# Patient Record
Sex: MALE | Race: BLACK OR AFRICAN AMERICAN | Employment: OTHER | URBAN - METROPOLITAN AREA
[De-identification: names, ages, dates, MRNs, and addresses within clinical notes are randomized per-mention and may not be internally consistent; named-entity substitution may affect disease eponyms.]

---

## 2018-01-19 ENCOUNTER — HOSPITAL ENCOUNTER (OUTPATIENT)
Dept: MRI IMAGING | Age: 58
Discharge: HOME | End: 2018-01-19
Attending: PSYCHIATRY & NEUROLOGY
Payer: MEDICARE

## 2018-01-19 DIAGNOSIS — R41.3 MEMORY LOSS: ICD-10-CM

## 2018-01-19 DIAGNOSIS — G47.33 OSA (OBSTRUCTIVE SLEEP APNEA): ICD-10-CM

## 2018-01-19 LAB — CREAT BLD-MCNC: 1 MG/DL (ref 0.8–1.5)

## 2018-01-19 PROCEDURE — 82565 ASSAY OF CREATININE: CPT

## 2018-01-19 PROCEDURE — A9577 INJ MULTIHANCE: HCPCS | Performed by: PSYCHIATRY & NEUROLOGY

## 2018-01-19 PROCEDURE — 70553 MRI BRAIN STEM W/O & W/DYE: CPT

## 2018-01-19 PROCEDURE — 74011250636 HC RX REV CODE- 250/636: Performed by: PSYCHIATRY & NEUROLOGY

## 2018-01-19 RX ORDER — SODIUM CHLORIDE 0.9 % (FLUSH) 0.9 %
10 SYRINGE (ML) INJECTION
Status: COMPLETED | OUTPATIENT
Start: 2018-01-19 | End: 2018-01-19

## 2018-01-19 RX ADMIN — Medication 10 ML: at 15:52

## 2018-01-19 RX ADMIN — GADOBENATE DIMEGLUMINE 20 ML: 529 INJECTION, SOLUTION INTRAVENOUS at 15:52

## 2018-01-22 ENCOUNTER — HOSPITAL ENCOUNTER (OUTPATIENT)
Dept: SLEEP MEDICINE | Age: 58
Discharge: HOME | End: 2018-01-22
Payer: MEDICARE

## 2018-01-22 PROCEDURE — 95810 POLYSOM 6/> YRS 4/> PARAM: CPT

## 2018-02-19 ENCOUNTER — HOSPITAL ENCOUNTER (OUTPATIENT)
Dept: SLEEP MEDICINE | Age: 58
Discharge: HOME | End: 2018-02-19
Payer: MEDICARE

## 2018-02-19 PROCEDURE — 95811 POLYSOM 6/>YRS CPAP 4/> PARM: CPT

## 2018-03-06 PROBLEM — G47.10 HYPERSOMNOLENCE: Status: ACTIVE | Noted: 2018-03-06

## 2018-03-06 PROBLEM — G47.33 OSA (OBSTRUCTIVE SLEEP APNEA): Status: ACTIVE | Noted: 2018-03-06

## 2019-09-18 PROBLEM — G47.34 NOCTURNAL HYPOXEMIA: Status: ACTIVE | Noted: 2019-09-18

## 2020-03-03 PROBLEM — N13.8 HYPERPLASIA OF PROSTATE WITH URINARY OBSTRUCTION: Status: ACTIVE | Noted: 2020-03-03

## 2020-03-03 PROBLEM — N40.1 HYPERPLASIA OF PROSTATE WITH URINARY OBSTRUCTION: Status: ACTIVE | Noted: 2020-03-03

## 2020-03-09 ENCOUNTER — HOSPITAL ENCOUNTER (OUTPATIENT)
Dept: SURGERY | Age: 60
Discharge: HOME | End: 2020-03-09

## 2020-03-10 RX ORDER — GENTAMICIN SULFATE 40 MG/ML
160 INJECTION, SOLUTION INTRAMUSCULAR; INTRAVENOUS
Status: CANCELLED | OUTPATIENT
Start: 2020-03-10 | End: 2020-03-10

## 2020-03-12 ENCOUNTER — ANESTHESIA EVENT (OUTPATIENT)
Dept: SURGERY | Age: 60
End: 2020-03-12
Payer: MEDICARE

## 2020-03-13 ENCOUNTER — HOSPITAL ENCOUNTER (OUTPATIENT)
Age: 60
Discharge: HOME | End: 2020-03-15
Attending: UROLOGY | Admitting: UROLOGY
Payer: MEDICARE

## 2020-03-13 ENCOUNTER — ANESTHESIA (OUTPATIENT)
Dept: SURGERY | Age: 60
End: 2020-03-13
Payer: MEDICARE

## 2020-03-13 DIAGNOSIS — N13.8 BPH WITH OBSTRUCTION/LOWER URINARY TRACT SYMPTOMS: Primary | ICD-10-CM

## 2020-03-13 DIAGNOSIS — N40.1 BPH WITH OBSTRUCTION/LOWER URINARY TRACT SYMPTOMS: Primary | ICD-10-CM

## 2020-03-13 LAB
ABO + RH BLD: NORMAL
BLOOD GROUP ANTIBODIES SERPL: NORMAL
GLUCOSE BLD STRIP.AUTO-MCNC: 87 MG/DL (ref 65–100)
SPECIMEN EXP DATE BLD: NORMAL

## 2020-03-13 PROCEDURE — 77030040830 HC CATH URETH FOL MDII -A: Performed by: UROLOGY

## 2020-03-13 PROCEDURE — 77030019927 HC TBNG IRR CYSTO BAXT -A: Performed by: UROLOGY

## 2020-03-13 PROCEDURE — 99218 HC RM OBSERVATION: CPT

## 2020-03-13 PROCEDURE — 76060000034 HC ANESTHESIA 1.5 TO 2 HR: Performed by: UROLOGY

## 2020-03-13 PROCEDURE — 74011250636 HC RX REV CODE- 250/636: Performed by: NURSE ANESTHETIST, CERTIFIED REGISTERED

## 2020-03-13 PROCEDURE — 76210000017 HC OR PH I REC 1.5 TO 2 HR: Performed by: UROLOGY

## 2020-03-13 PROCEDURE — 76010000149 HC OR TIME 1 TO 1.5 HR: Performed by: UROLOGY

## 2020-03-13 PROCEDURE — 74011000250 HC RX REV CODE- 250: Performed by: NURSE ANESTHETIST, CERTIFIED REGISTERED

## 2020-03-13 PROCEDURE — 77030010509 HC AIRWY LMA MSK TELE -A: Performed by: ANESTHESIOLOGY

## 2020-03-13 PROCEDURE — 74011250637 HC RX REV CODE- 250/637: Performed by: ANESTHESIOLOGY

## 2020-03-13 PROCEDURE — 77030041444 HC ELECTRD PSS QUICK-FIRE PSSU -D: Performed by: UROLOGY

## 2020-03-13 PROCEDURE — 74011250636 HC RX REV CODE- 250/636: Performed by: UROLOGY

## 2020-03-13 PROCEDURE — 77030018846 HC SOL IRR STRL H20 ICUM -A: Performed by: UROLOGY

## 2020-03-13 PROCEDURE — 74011250637 HC RX REV CODE- 250/637: Performed by: UROLOGY

## 2020-03-13 PROCEDURE — 77030018836 HC SOL IRR NACL ICUM -A: Performed by: UROLOGY

## 2020-03-13 PROCEDURE — 86900 BLOOD TYPING SEROLOGIC ABO: CPT

## 2020-03-13 PROCEDURE — 82962 GLUCOSE BLOOD TEST: CPT

## 2020-03-13 PROCEDURE — 74011250636 HC RX REV CODE- 250/636: Performed by: ANESTHESIOLOGY

## 2020-03-13 PROCEDURE — 77030040361 HC SLV COMPR DVT MDII -B: Performed by: UROLOGY

## 2020-03-13 RX ORDER — OXYCODONE HYDROCHLORIDE 5 MG/1
5 TABLET ORAL
Status: COMPLETED | OUTPATIENT
Start: 2020-03-13 | End: 2020-03-13

## 2020-03-13 RX ORDER — SODIUM CHLORIDE 0.9 % (FLUSH) 0.9 %
5-40 SYRINGE (ML) INJECTION AS NEEDED
Status: DISCONTINUED | OUTPATIENT
Start: 2020-03-13 | End: 2020-03-13 | Stop reason: HOSPADM

## 2020-03-13 RX ORDER — DEXTROSE, SODIUM CHLORIDE, SODIUM LACTATE, POTASSIUM CHLORIDE, AND CALCIUM CHLORIDE 5; .6; .31; .03; .02 G/100ML; G/100ML; G/100ML; G/100ML; G/100ML
100 INJECTION, SOLUTION INTRAVENOUS CONTINUOUS
Status: DISCONTINUED | OUTPATIENT
Start: 2020-03-13 | End: 2020-03-14

## 2020-03-13 RX ORDER — NALOXONE HYDROCHLORIDE 0.4 MG/ML
0.4 INJECTION, SOLUTION INTRAMUSCULAR; INTRAVENOUS; SUBCUTANEOUS AS NEEDED
Status: DISCONTINUED | OUTPATIENT
Start: 2020-03-13 | End: 2020-03-15 | Stop reason: HOSPADM

## 2020-03-13 RX ORDER — MORPHINE SULFATE 2 MG/ML
2 INJECTION, SOLUTION INTRAMUSCULAR; INTRAVENOUS
Status: DISCONTINUED | OUTPATIENT
Start: 2020-03-13 | End: 2020-03-15 | Stop reason: HOSPADM

## 2020-03-13 RX ORDER — FENTANYL CITRATE 50 UG/ML
INJECTION, SOLUTION INTRAMUSCULAR; INTRAVENOUS AS NEEDED
Status: DISCONTINUED | OUTPATIENT
Start: 2020-03-13 | End: 2020-03-13 | Stop reason: HOSPADM

## 2020-03-13 RX ORDER — PANTOPRAZOLE SODIUM 40 MG/1
40 TABLET, DELAYED RELEASE ORAL DAILY
Status: DISCONTINUED | OUTPATIENT
Start: 2020-03-14 | End: 2020-03-15 | Stop reason: HOSPADM

## 2020-03-13 RX ORDER — ASPIRIN 81 MG/1
81 TABLET ORAL DAILY
Status: DISCONTINUED | OUTPATIENT
Start: 2020-03-14 | End: 2020-03-15 | Stop reason: HOSPADM

## 2020-03-13 RX ORDER — TAMSULOSIN HYDROCHLORIDE 0.4 MG/1
0.4 CAPSULE ORAL
Status: DISCONTINUED | OUTPATIENT
Start: 2020-03-13 | End: 2020-03-15 | Stop reason: HOSPADM

## 2020-03-13 RX ORDER — SODIUM CHLORIDE 0.9 % (FLUSH) 0.9 %
5-40 SYRINGE (ML) INJECTION EVERY 8 HOURS
Status: DISCONTINUED | OUTPATIENT
Start: 2020-03-13 | End: 2020-03-13 | Stop reason: HOSPADM

## 2020-03-13 RX ORDER — LIDOCAINE HYDROCHLORIDE 20 MG/ML
INJECTION, SOLUTION EPIDURAL; INFILTRATION; INTRACAUDAL; PERINEURAL AS NEEDED
Status: DISCONTINUED | OUTPATIENT
Start: 2020-03-13 | End: 2020-03-13 | Stop reason: HOSPADM

## 2020-03-13 RX ORDER — HYDROMORPHONE HYDROCHLORIDE 2 MG/ML
0.5 INJECTION, SOLUTION INTRAMUSCULAR; INTRAVENOUS; SUBCUTANEOUS
Status: COMPLETED | OUTPATIENT
Start: 2020-03-13 | End: 2020-03-13

## 2020-03-13 RX ORDER — VERAPAMIL HYDROCHLORIDE 240 MG/1
240 TABLET, FILM COATED, EXTENDED RELEASE ORAL
Status: DISCONTINUED | OUTPATIENT
Start: 2020-03-13 | End: 2020-03-15 | Stop reason: HOSPADM

## 2020-03-13 RX ORDER — HYDROMORPHONE HYDROCHLORIDE 2 MG/ML
INJECTION, SOLUTION INTRAMUSCULAR; INTRAVENOUS; SUBCUTANEOUS AS NEEDED
Status: DISCONTINUED | OUTPATIENT
Start: 2020-03-13 | End: 2020-03-13 | Stop reason: HOSPADM

## 2020-03-13 RX ORDER — DIPHENHYDRAMINE HCL 25 MG
25 CAPSULE ORAL
Status: DISCONTINUED | OUTPATIENT
Start: 2020-03-13 | End: 2020-03-15 | Stop reason: HOSPADM

## 2020-03-13 RX ORDER — OXYCODONE AND ACETAMINOPHEN 5; 325 MG/1; MG/1
1 TABLET ORAL
Status: DISCONTINUED | OUTPATIENT
Start: 2020-03-13 | End: 2020-03-15 | Stop reason: HOSPADM

## 2020-03-13 RX ORDER — ATORVASTATIN CALCIUM 40 MG/1
80 TABLET, FILM COATED ORAL
Status: DISCONTINUED | OUTPATIENT
Start: 2020-03-13 | End: 2020-03-15 | Stop reason: HOSPADM

## 2020-03-13 RX ORDER — MEMANTINE HYDROCHLORIDE 5 MG/1
10 TABLET ORAL DAILY
Status: DISCONTINUED | OUTPATIENT
Start: 2020-03-14 | End: 2020-03-15 | Stop reason: HOSPADM

## 2020-03-13 RX ORDER — LEVOTHYROXINE SODIUM 125 UG/1
125 TABLET ORAL
Status: DISCONTINUED | OUTPATIENT
Start: 2020-03-14 | End: 2020-03-15 | Stop reason: HOSPADM

## 2020-03-13 RX ORDER — ONDANSETRON 2 MG/ML
INJECTION INTRAMUSCULAR; INTRAVENOUS AS NEEDED
Status: DISCONTINUED | OUTPATIENT
Start: 2020-03-13 | End: 2020-03-13 | Stop reason: HOSPADM

## 2020-03-13 RX ORDER — SODIUM CHLORIDE 0.9 % (FLUSH) 0.9 %
5-40 SYRINGE (ML) INJECTION AS NEEDED
Status: DISCONTINUED | OUTPATIENT
Start: 2020-03-13 | End: 2020-03-15 | Stop reason: HOSPADM

## 2020-03-13 RX ORDER — DOCUSATE SODIUM 100 MG/1
100 CAPSULE, LIQUID FILLED ORAL 2 TIMES DAILY
Status: DISCONTINUED | OUTPATIENT
Start: 2020-03-13 | End: 2020-03-15 | Stop reason: HOSPADM

## 2020-03-13 RX ORDER — MIDAZOLAM HYDROCHLORIDE 1 MG/ML
2 INJECTION, SOLUTION INTRAMUSCULAR; INTRAVENOUS
Status: DISCONTINUED | OUTPATIENT
Start: 2020-03-13 | End: 2020-03-13 | Stop reason: HOSPADM

## 2020-03-13 RX ORDER — CIPROFLOXACIN 500 MG/1
500 TABLET ORAL EVERY 12 HOURS
Status: DISCONTINUED | OUTPATIENT
Start: 2020-03-13 | End: 2020-03-13

## 2020-03-13 RX ORDER — MIDAZOLAM HYDROCHLORIDE 1 MG/ML
INJECTION, SOLUTION INTRAMUSCULAR; INTRAVENOUS AS NEEDED
Status: DISCONTINUED | OUTPATIENT
Start: 2020-03-13 | End: 2020-03-13 | Stop reason: HOSPADM

## 2020-03-13 RX ORDER — OXYCODONE AND ACETAMINOPHEN 10; 325 MG/1; MG/1
1 TABLET ORAL AS NEEDED
Status: DISCONTINUED | OUTPATIENT
Start: 2020-03-13 | End: 2020-03-13 | Stop reason: HOSPADM

## 2020-03-13 RX ORDER — GENTAMICIN SULFATE 80 MG/100ML
80 INJECTION, SOLUTION INTRAVENOUS
Status: COMPLETED | OUTPATIENT
Start: 2020-03-13 | End: 2020-03-13

## 2020-03-13 RX ORDER — OXYBUTYNIN CHLORIDE 5 MG/1
5 TABLET ORAL
Status: DISCONTINUED | OUTPATIENT
Start: 2020-03-13 | End: 2020-03-15 | Stop reason: HOSPADM

## 2020-03-13 RX ORDER — ACETAMINOPHEN 325 MG/1
650 TABLET ORAL
Status: DISCONTINUED | OUTPATIENT
Start: 2020-03-13 | End: 2020-03-15 | Stop reason: HOSPADM

## 2020-03-13 RX ORDER — SODIUM CHLORIDE, SODIUM LACTATE, POTASSIUM CHLORIDE, CALCIUM CHLORIDE 600; 310; 30; 20 MG/100ML; MG/100ML; MG/100ML; MG/100ML
75 INJECTION, SOLUTION INTRAVENOUS CONTINUOUS
Status: DISCONTINUED | OUTPATIENT
Start: 2020-03-13 | End: 2020-03-13 | Stop reason: HOSPADM

## 2020-03-13 RX ORDER — SODIUM CHLORIDE 0.9 % (FLUSH) 0.9 %
5-40 SYRINGE (ML) INJECTION EVERY 8 HOURS
Status: DISCONTINUED | OUTPATIENT
Start: 2020-03-13 | End: 2020-03-15 | Stop reason: HOSPADM

## 2020-03-13 RX ORDER — CEPHALEXIN 500 MG/1
500 CAPSULE ORAL 4 TIMES DAILY
Status: DISCONTINUED | OUTPATIENT
Start: 2020-03-13 | End: 2020-03-15 | Stop reason: HOSPADM

## 2020-03-13 RX ORDER — PROPOFOL 10 MG/ML
INJECTION, EMULSION INTRAVENOUS AS NEEDED
Status: DISCONTINUED | OUTPATIENT
Start: 2020-03-13 | End: 2020-03-13 | Stop reason: HOSPADM

## 2020-03-13 RX ORDER — ATROPA BELLADONNA AND OPIUM 16.2; 6 MG/1; MG/1
1 SUPPOSITORY RECTAL ONCE
Status: COMPLETED | OUTPATIENT
Start: 2020-03-13 | End: 2020-03-13

## 2020-03-13 RX ORDER — CEFAZOLIN SODIUM/WATER 2 G/20 ML
2 SYRINGE (ML) INTRAVENOUS
Status: COMPLETED | OUTPATIENT
Start: 2020-03-13 | End: 2020-03-13

## 2020-03-13 RX ADMIN — SODIUM CHLORIDE, SODIUM LACTATE, POTASSIUM CHLORIDE, CALCIUM CHLORIDE, AND DEXTROSE MONOHYDRATE 100 ML/HR: 600; 310; 30; 20; 5 INJECTION, SOLUTION INTRAVENOUS at 22:57

## 2020-03-13 RX ADMIN — PROPOFOL 100 MG: 10 INJECTION, EMULSION INTRAVENOUS at 08:01

## 2020-03-13 RX ADMIN — Medication 10 ML: at 21:55

## 2020-03-13 RX ADMIN — OXYCODONE HYDROCHLORIDE AND ACETAMINOPHEN 1 TABLET: 5; 325 TABLET ORAL at 16:17

## 2020-03-13 RX ADMIN — HYDROMORPHONE HYDROCHLORIDE 0.5 MG: 2 INJECTION INTRAMUSCULAR; INTRAVENOUS; SUBCUTANEOUS at 10:08

## 2020-03-13 RX ADMIN — Medication 2 G: at 07:54

## 2020-03-13 RX ADMIN — OXYBUTYNIN CHLORIDE 5 MG: 5 TABLET ORAL at 11:27

## 2020-03-13 RX ADMIN — HYDROMORPHONE HYDROCHLORIDE 0.5 MG: 2 INJECTION INTRAMUSCULAR; INTRAVENOUS; SUBCUTANEOUS at 10:20

## 2020-03-13 RX ADMIN — FENTANYL CITRATE 50 MCG: 50 INJECTION INTRAMUSCULAR; INTRAVENOUS at 08:09

## 2020-03-13 RX ADMIN — HYDROMORPHONE HYDROCHLORIDE 0.5 MG: 2 INJECTION INTRAMUSCULAR; INTRAVENOUS; SUBCUTANEOUS at 10:39

## 2020-03-13 RX ADMIN — OXYCODONE HYDROCHLORIDE 5 MG: 5 TABLET ORAL at 11:27

## 2020-03-13 RX ADMIN — CEPHALEXIN 500 MG: 500 CAPSULE ORAL at 21:54

## 2020-03-13 RX ADMIN — PROPOFOL 200 MG: 10 INJECTION, EMULSION INTRAVENOUS at 08:00

## 2020-03-13 RX ADMIN — FENTANYL CITRATE 50 MCG: 50 INJECTION INTRAMUSCULAR; INTRAVENOUS at 08:21

## 2020-03-13 RX ADMIN — FENTANYL CITRATE 50 MCG: 50 INJECTION INTRAMUSCULAR; INTRAVENOUS at 08:28

## 2020-03-13 RX ADMIN — MIDAZOLAM HYDROCHLORIDE 2 MG: 2 INJECTION, SOLUTION INTRAMUSCULAR; INTRAVENOUS at 07:56

## 2020-03-13 RX ADMIN — HYDROMORPHONE HYDROCHLORIDE 0.5 MG: 2 INJECTION INTRAMUSCULAR; INTRAVENOUS; SUBCUTANEOUS at 09:00

## 2020-03-13 RX ADMIN — TAMSULOSIN HYDROCHLORIDE 0.4 MG: 0.4 CAPSULE ORAL at 21:54

## 2020-03-13 RX ADMIN — GENTAMICIN SULFATE 80 MG: 80 INJECTION, SOLUTION INTRAVENOUS at 07:47

## 2020-03-13 RX ADMIN — ATORVASTATIN CALCIUM 80 MG: 40 TABLET, FILM COATED ORAL at 21:54

## 2020-03-13 RX ADMIN — Medication 10 ML: at 14:37

## 2020-03-13 RX ADMIN — SODIUM CHLORIDE, SODIUM LACTATE, POTASSIUM CHLORIDE, CALCIUM CHLORIDE, AND DEXTROSE MONOHYDRATE 100 ML/HR: 600; 310; 30; 20; 5 INJECTION, SOLUTION INTRAVENOUS at 12:59

## 2020-03-13 RX ADMIN — LIDOCAINE HYDROCHLORIDE 100 MG: 20 INJECTION, SOLUTION EPIDURAL; INFILTRATION; INTRACAUDAL; PERINEURAL at 08:00

## 2020-03-13 RX ADMIN — VERAPAMIL HYDROCHLORIDE 240 MG: 240 TABLET, FILM COATED, EXTENDED RELEASE ORAL at 21:53

## 2020-03-13 RX ADMIN — CEPHALEXIN 500 MG: 500 CAPSULE ORAL at 17:36

## 2020-03-13 RX ADMIN — CEPHALEXIN 500 MG: 500 CAPSULE ORAL at 12:58

## 2020-03-13 RX ADMIN — ONDANSETRON 4 MG: 2 INJECTION INTRAMUSCULAR; INTRAVENOUS at 08:21

## 2020-03-13 RX ADMIN — SODIUM CHLORIDE, SODIUM LACTATE, POTASSIUM CHLORIDE, AND CALCIUM CHLORIDE 75 ML/HR: 600; 310; 30; 20 INJECTION, SOLUTION INTRAVENOUS at 07:04

## 2020-03-13 RX ADMIN — GENTAMICIN SULFATE 80 MG: 80 INJECTION, SOLUTION INTRAVENOUS at 07:04

## 2020-03-13 RX ADMIN — HYDROMORPHONE HYDROCHLORIDE 0.5 MG: 2 INJECTION INTRAMUSCULAR; INTRAVENOUS; SUBCUTANEOUS at 10:31

## 2020-03-13 RX ADMIN — FENTANYL CITRATE 50 MCG: 50 INJECTION INTRAMUSCULAR; INTRAVENOUS at 07:56

## 2020-03-13 RX ADMIN — HYDROMORPHONE HYDROCHLORIDE 0.5 MG: 2 INJECTION INTRAMUSCULAR; INTRAVENOUS; SUBCUTANEOUS at 09:05

## 2020-03-13 RX ADMIN — ACETAMINOPHEN 650 MG: 325 TABLET, FILM COATED ORAL at 23:05

## 2020-03-13 RX ADMIN — ATROPA BELLADONNA AND OPIUM 1 SUPPOSITORY: 16.2; 6 SUPPOSITORY RECTAL at 11:32

## 2020-03-13 NOTE — PROGRESS NOTES
Pt states that he is immune to cipro.  Dr. Princess Jackson aware and new order for keflex 500 mg QID and d/c cipro

## 2020-03-13 NOTE — PERIOP NOTES
Family visiting at bedside at this time, pt still complaining of intense pressure and burning sensation in his bladder/penis.  Dr. Paul Monet gave verbal order for B&O suppository x once a this time

## 2020-03-13 NOTE — OP NOTES
[facility]  OPERATIVE REPORT    Name:  Nixon Wong  MR#:  [id number]  :  1960  ACCOUNT #:  [de-identified]  DATE OF SERVICE:  2020    PREOPERATIVE DIAGNOSIS:  Benign prostatic hyperplasia with lower urinary tract symptoms. POSTOPERATIVE DIAGNOSIS:  Benign prostatic hyperplasia with lower urinary tract symptoms. PROCEDURE PERFORMED:  Transurethral bipolar vaporization of the prostate. SURGEON:  Bernard Angel MD.    ASSISTANT:  None. ANESTHESIA:  General.    COMPLICATIONS:  None. SPECIMENS REMOVED:  None. IMPLANTS:  None. ESTIMATED BLOOD LOSS:  Minimal.    FINDINGS:  Per dictation. OPERATIVE PROCEDURE:  The patient was taken to the operating room suite, underwent general anesthesia, placed in the dorsal lithotomy position, prepped with Betadine and draped in appropriate manner. A #28-Salvadorean Linette Nails sound was used to calibrate the urethra. Then a 26-Salvadorean resectoscope was passed per urethra into the bladder. Using the bipolar button electrode, the patient had the bladder inspected. There were no lesions. He had a prominent median intravesical lobe which was causing most of his issues and some lateral lobe hypertrophy. Beginning with the median lobe, this tissue was vaporized until the bladder neck was wide open and then the lateral tissue was vaporized. The electrocautery was used for hemostasis throughout the procedure. There was good hemostasis and a good open lumen at the end of the case. The patient had the bladder filled with irrigation and the scope was removed and there was a good strong stream.  The patient then had a 24-Salvadorean three-way Lopez catheter inserted into the bladder and connected to continuous bladder irrigation. The patient tolerated the procedure well. The urine was pink and clear. He would keep the catheter for at least 36-24 hours and would have the catheter removed prior to discharge.       [address] [place] [address] MD [email or web address]  D:  03/13/2020 9:39  T:  03/13/2020 13:38  JOB #:  [id number]

## 2020-03-13 NOTE — H&P
64 y.o., male presents for TURP  Patient with BPH voiding symptoms. CT showed no stone or hydronephrosis. There was a small right renal nonobstructing stone. Does have some median lobe hypertrophy and some slight enlargement of the prostate. 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/2014    Hypertrophy of prostate without urinary obstruction and other lower urinary tract symptoms (LUTS) 3/4/2014    Impotence of organic origin 3/4/2014    Osteoarthrosis, unspecified whether generalized or localized, unspecified site 3/4/2014    Other abnormal glucose 3/4/2014    Other and unspecified hyperlipidemia 3/4/2014    Other specified disorder of male genital organs(608.89) 3/4/2014    Prostatitis, unspecified 3/4/2014    Thyroid disease     graves dse x 9yrs    Unspecified hereditary and idiopathic peripheral neuropathy 3/4/2014    Unspecified hypothyroidism 3/4/2014    Urinary frequency 3/4/2014           Past Surgical History:   Procedure Laterality Date    HX CIRCUMCISION      HX ORTHOPAEDIC      bilat bunionectomy, then right foot screw removed    HX ORTHOPAEDIC      right shoulder surg x 3    HX ORTHOPAEDIC      left shoulder surg- 11/09    HX THYROIDECTOMY              Current Outpatient Medications   Medication Sig Dispense Refill    tamsulosin (FLOMAX) 0.4 mg capsule Take 1 Cap by mouth nightly for 90 days.  90 Cap 4    ciprofloxacin HCl (CIPRO) 500 mg tablet Take 1 tablet by mouth 2 time per day for 1 week then reduce to 1 time per day for 3 weeks 35 Tab 0    memantine (NAMENDA) 10 mg tablet 1 TABLET TWICE A  Tab 4    tadalafil (CIALIS) 5 mg tablet TAKE 1 TABLET AS NEEDED 90 Tab 3    HYDROcodone-acetaminophen (NORCO)  mg tablet   0    levothyroxine (SYNTHROID) 137 mcg tablet Take by mouth Daily (before breakfast).  CHROM MELYSSA/BRINDAL BERRY (GARCINIA CAMBOGIA PO) Take by mouth.  atorvastatin (LIPITOR) 80 mg tablet       Dexlansoprazole (DEXILANT) 60 mg CpDB Take by mouth.  verapamil SR (CALAN-SR) 240 mg CR tablet Take by mouth nightly.              Allergies   Allergen Reactions    Wheat Bran Other (comments)     blisters    Wheat Containing Prod Nausea and Vomiting     Social History           Socioeconomic History    Marital status:      Spouse name: Not on file    Number of children: Not on file    Years of education: Not on file    Highest education level: Not on file   Occupational History    Not on file   Social Needs    Financial resource strain: Not on file    Food insecurity:     Worry: Not on file     Inability: Not on file    Transportation needs:     Medical: Not on file     Non-medical: Not on file   Tobacco Use    Smoking status: Never Smoker    Smokeless tobacco: Never Used   Substance and Sexual Activity    Alcohol use: No    Drug use: No    Sexual activity: Not on file   Lifestyle    Physical activity:     Days per week: Not on file     Minutes per session: Not on file    Stress: Not on file   Relationships    Social connections:     Talks on phone: Not on file     Gets together: Not on file     Attends Scientologist service: Not on file     Active member of club or organization: Not on file     Attends meetings of clubs or organizations: Not on file     Relationship status: Not on file    Intimate partner violence:     Fear of current or ex partner: Not on file     Emotionally abused: Not on file     Physically abused: Not on file     Forced sexual activity: Not on file   Other Topics Concern    Not on file   Social History Narrative    Not on file           Family History   Problem Relation Age of Onset    Hypertension Maternal Grandmother     Hypertension Maternal Grandfather     High Cholesterol Mother      Patient Vitals for the past 12 hrs:   Temp Pulse Resp BP SpO2   03/13/20 0651 98.9 °F (37.2 °C) 62 16 147/80 99 %     Physical Exam  Constitutional:       Appearance: Normal appearance. Cardiovascular:      Rate and Rhythm: Normal rate and regular rhythm. Pulmonary:      Effort: Pulmonary effort is normal.      Breath sounds: Normal breath sounds. Abdominal:      General: Abdomen is flat. Genitourinary:     Penis: Normal.    Musculoskeletal: Normal range of motion. Skin:     General: Skin is warm and dry. Neurological:      General: No focal deficit present. Mental Status: He is alert. Psychiatric:         Mood and Affect: Mood normal.           Office Cystoscopy Procedure findings: All risks, benefits and alternatives were again reviewed with patient and he is willing to proceed at this time. His genital area was prepped and draped and a sterile field applied. 2% lidocaine jelly was injected in the the urethra and allowed to dwell for several minutes. A flexible cystoscope was then inserted into the urethral meatus and advanced under direct vision. The anterior and posterior urethra appeared normal in appearance. The prostate had some lateral lobe hypertrophy with some prominence of the intravesical lobe of the prostate. After looking the bladder the scope was retroflexed and showed the prominent ball-valve median lobe pushing up into the bladder that would be causing some of the symptoms. The bladder was systematically surveyed. No bladder trabeculations were seen. No mucosal abnormalities were seen. The ureteral orifices were seen in their normal orthotopic position. The cystoscope was then removed under direct vision. The patient tolerated the procedure well. Assessment and Plan     ICD-10-CM ICD-9-CM    1.  Urinary frequency R35.0 788.41 AMB POC URINALYSIS DIP STICK AUTO W/ MICRO (PGU) CYSTOURETHROSCOPY   2. Benign non-nodular prostatic hyperplasia without lower urinary tract symptoms N40.0 600.90    3. Hyperplasia of prostate with urinary obstruction N40.1 600.91     N13.8 599.69      Patient with nocturia still with urinary frequency doing better than he did couple years ago when I saw him he had a cystoscope and showed the similar findings. Patient with BPH with prominent median lobe. No need for surgery at this time I recommend that he go on an alpha-blocker tamsulosin 1 at bedtime. CT scan on 12/19 showed no other acute process small nonobstructing renal stone in the prominent intravesical portion of the prostate. This was performed at [facility] today doing a transurethral resection of the median lobe of the prostate with the bipolar scope.  Discussed procedure and risks and complications

## 2020-03-13 NOTE — ANESTHESIA PREPROCEDURE EVALUATION
Relevant Problems   No relevant active problems       Anesthetic History               Review of Systems / Medical History  Patient summary reviewed and pertinent labs reviewed    Pulmonary        Sleep apnea: CPAP           Neuro/Psych              Cardiovascular    Hypertension              Exercise tolerance: >4 METS  Comments: Stress thallium 10/19--preserved LV fx   GI/Hepatic/Renal     GERD           Endo/Other      Hypothyroidism: well controlled  Obesity     Other Findings            Physical Exam    Airway  Mallampati: III  TM Distance: 4 - 6 cm  Neck ROM: normal range of motion   Mouth opening: Normal     Cardiovascular    Rhythm: regular           Dental    Dentition: Caps/crowns     Pulmonary                 Abdominal  GI exam deferred       Other Findings            Anesthetic Plan    ASA: 3  Anesthesia type: general          Induction: Intravenous  Anesthetic plan and risks discussed with: Patient

## 2020-03-13 NOTE — PROGRESS NOTES
Care Management Interventions  Mode of Transport at Discharge: Self  Transition of Care Consult (CM Consult): Discharge Planning  Discharge Durable Medical Equipment: No  Physical Therapy Consult: No  Occupational Therapy Consult: No  Speech Therapy Consult: No  Confirm Follow Up Transport: Self  Discharge Location  Discharge Placement: Home    Chart screened by  for discharge planning. Patient will likely return home at discharge when medically stable. No CM needs have been identified at this time. CM will continue to follow patient during hospitalization for discharge planning and needs. Please consult or notify  if any new issues arise.

## 2020-03-13 NOTE — PROGRESS NOTES
Hourly rounding completed on this shift. CBI at moderate to slow rate. Pt arrived with urine clear and urine became red at times on this shift but since has cleared. No hand irrigation on this shift. Pt ambulated in hallway once on this shift. All needs met. Pt is currently resting in bed. Will continue to monitor and give report to oncoming nurse.

## 2020-03-13 NOTE — ANESTHESIA POSTPROCEDURE EVALUATION
Procedure(s):  TRANSURETHRAL RESECTION OF PROSTATE WITH BIPOLAR. general    Anesthesia Post Evaluation      Multimodal analgesia: multimodal analgesia used between 6 hours prior to anesthesia start to PACU discharge  Patient location during evaluation: PACU  Patient participation: complete - patient participated  Level of consciousness: awake  Pain management: adequate  Airway patency: patent  Anesthetic complications: no  Cardiovascular status: acceptable  Respiratory status: acceptable  Hydration status: acceptable  Post anesthesia nausea and vomiting:  none      Vitals Value Taken Time   /83 3/13/2020 10:42 AM   Temp 36.8 °C (98.2 °F) 3/13/2020 10:45 AM   Pulse 64 3/13/2020 11:02 AM   Resp 16 3/13/2020 10:45 AM   SpO2 93 % 3/13/2020 11:02 AM   Vitals shown include unvalidated device data.

## 2020-03-13 NOTE — PROGRESS NOTES
TRANSFER - IN REPORT:    Verbal report received from Ayan Nichols RN on Xenia Rodgers  being received from PACU for routine post - op      Report consisted of patients Situation, Background, Assessment and   Recommendations(SBAR). Information from the following report(s) SBAR was reviewed with the receiving nurse. Opportunity for questions and clarification was provided. Assessment completed upon patients arrival to unit and care assumed.

## 2020-03-13 NOTE — BRIEF OP NOTE
BRIEF OPERATIVE NOTE    Date of Procedure: 3/13/2020   Preoperative Diagnosis: Benign prostatic hyperplasia with lower urinary tract symptoms, symptom details unspecified [N40.1]  Postoperative Diagnosis: Benign prostatic hyperplasia with lower urinary tract symptoms, symptom details unspecified [N40.1]    Procedure(s):  TRANSURETHRAL RESECTION OF PROSTATE WITH BIPOLAR  Surgeon(s) and Role:     Carolyn Granados MD - Primary         Surgical Assistant:     Surgical Staff:  Circ-1: Yelitza Carl RN  Scrub [id number]  Event Time In   Incision Start 0813   Incision Close 0915     Anesthesia: General   Estimated Blood Loss:min  Specimens: * No specimens in log *   Findings:    Complications:   Implants: * No implants in log *

## 2020-03-13 NOTE — PROGRESS NOTES
03/13/20 1220   Dual Skin Pressure Injury Assessment   Dual Skin Pressure Injury Assessment WDL   Second Care Provider (Based on [address] [place] [address]) Cheri Ibarra RN     Dual skin assessment completed. Pt has scars on bilateral shoulders, bilateral feet, and right knee.

## 2020-03-13 NOTE — PERIOP NOTES
TRANSFER - OUT REPORT:    Verbal report given to Simeon Randall RN(name) on Deward Rater  being transferred to [facility](unit) for routine post - op       Report consisted of patients Situation, Background, Assessment and   Recommendations(SBAR). Information from the following report(s) Kardex, OR Summary, Intake/Output and MAR was reviewed with the receiving nurse. Lines:   Peripheral IV 03/13/20 Right Hand (Active)   Site Assessment Clean, dry, & intact 3/13/2020 10:45 AM   Phlebitis Assessment 0 3/13/2020 10:45 AM   Infiltration Assessment 0 3/13/2020 10:45 AM   Dressing Status Clean, dry, & intact 3/13/2020 10:45 AM   Dressing Type Transparent;Tape 3/13/2020 10:45 AM   Hub Color/Line Status Infusing;Green 3/13/2020 10:45 AM   Alcohol Cap Used No 3/13/2020 10:45 AM        Opportunity for questions and clarification was provided.       Patient transported with:  Transport personnel

## 2020-03-14 PROCEDURE — 99218 HC RM OBSERVATION: CPT

## 2020-03-14 PROCEDURE — 74011250636 HC RX REV CODE- 250/636: Performed by: UROLOGY

## 2020-03-14 PROCEDURE — 74011250637 HC RX REV CODE- 250/637: Performed by: UROLOGY

## 2020-03-14 PROCEDURE — 77030018836 HC SOL IRR NACL ICUM -A

## 2020-03-14 RX ORDER — SODIUM CHLORIDE 0.9 % (FLUSH) 0.9 %
5-40 SYRINGE (ML) INJECTION AS NEEDED
Status: DISCONTINUED | OUTPATIENT
Start: 2020-03-14 | End: 2020-03-15 | Stop reason: HOSPADM

## 2020-03-14 RX ORDER — SODIUM CHLORIDE 0.9 % (FLUSH) 0.9 %
5-40 SYRINGE (ML) INJECTION EVERY 8 HOURS
Status: DISCONTINUED | OUTPATIENT
Start: 2020-03-14 | End: 2020-03-15 | Stop reason: HOSPADM

## 2020-03-14 RX ADMIN — LEVOTHYROXINE SODIUM 125 MCG: 125 TABLET ORAL at 05:14

## 2020-03-14 RX ADMIN — PANTOPRAZOLE SODIUM 40 MG: 40 TABLET, DELAYED RELEASE ORAL at 05:15

## 2020-03-14 RX ADMIN — VERAPAMIL HYDROCHLORIDE 240 MG: 240 TABLET, FILM COATED, EXTENDED RELEASE ORAL at 21:11

## 2020-03-14 RX ADMIN — CEPHALEXIN 500 MG: 500 CAPSULE ORAL at 09:02

## 2020-03-14 RX ADMIN — Medication 10 ML: at 10:20

## 2020-03-14 RX ADMIN — Medication 10 ML: at 13:32

## 2020-03-14 RX ADMIN — TAMSULOSIN HYDROCHLORIDE 0.4 MG: 0.4 CAPSULE ORAL at 21:11

## 2020-03-14 RX ADMIN — CEPHALEXIN 500 MG: 500 CAPSULE ORAL at 12:26

## 2020-03-14 RX ADMIN — MEMANTINE 10 MG: 5 TABLET ORAL at 09:02

## 2020-03-14 RX ADMIN — CEPHALEXIN 500 MG: 500 CAPSULE ORAL at 21:11

## 2020-03-14 RX ADMIN — ACETAMINOPHEN 650 MG: 325 TABLET, FILM COATED ORAL at 09:07

## 2020-03-14 RX ADMIN — ASPIRIN 81 MG: 81 TABLET ORAL at 09:02

## 2020-03-14 RX ADMIN — SODIUM CHLORIDE, SODIUM LACTATE, POTASSIUM CHLORIDE, CALCIUM CHLORIDE, AND DEXTROSE MONOHYDRATE 100 ML/HR: 600; 310; 30; 20; 5 INJECTION, SOLUTION INTRAVENOUS at 07:50

## 2020-03-14 RX ADMIN — Medication 10 ML: at 05:26

## 2020-03-14 RX ADMIN — Medication 10 ML: at 13:33

## 2020-03-14 RX ADMIN — Medication 10 ML: at 21:12

## 2020-03-14 RX ADMIN — ATORVASTATIN CALCIUM 80 MG: 40 TABLET, FILM COATED ORAL at 21:11

## 2020-03-14 RX ADMIN — CEPHALEXIN 500 MG: 500 CAPSULE ORAL at 17:01

## 2020-03-14 NOTE — PROGRESS NOTES
Hourly rounds completed this shift. All needs met at this time. Bed low/locked. Call light within reach. Will continue to monitor and give bedside report to oncoming nurse. CBI at slow drip. Urine pink/red flecks.

## 2020-03-14 NOTE — PROGRESS NOTES
Urology Progress Note    Admit Date: 3/13/2020    Subjective:     Patient had 1 episode of dizziness this AM but otherwise doing well. CBI weaned to slow drip. Pain controlled. Tolerating regular diet. Ambulating. Objective:     Patient Vitals for the past 8 hrs:   BP Temp Pulse Resp SpO2 Weight   03/14/20 0712 128/56 98.2 °F (36.8 °C) (!) 58 18 98 %    03/14/20 0358 133/67 98.3 °F (36.8 °C) (!) 55 18 96 % 217 lb (98.4 kg)     03/14 0701 - 03/14 1900  In: 120 [P.O.:120]  Out: -   03/12 1901 - 03/14 0700  In: 18015 [I.V.:900]  Out: 92900 [Urine:31333]    Physical Exam:     Visit Vitals  /56   Pulse (!) 58   Temp 98.2 °F (36.8 °C)   Resp 18   Ht 5' 11.5\" (1.816 m)   Wt 217 lb (98.4 kg)   SpO2 98%   BMI 29.84 kg/m²        GENERAL: No acute distress, Awake, Alert, Oriented X 3, Gait normal  CARDIAC: regular rate and rhythm  CHEST AND LUNG: Easy work of breathing, clear to auscultation bilaterally, no cyanosis  ABDOMEN: soft, non tender, non-distended, positive bowel sounds, no organomegaly, no palpable masses, no guarding, no rebound tenderness  : Lopez draining clear pink on slow drip   SKIN: No rash, no erythema, no lacerations or abrasions, no ecchymosis  NEUROLOGIC: cranial nerves 2-12 grossly intact           Data Review   Recent Results (from the past 24 hour(s))   GLUCOSE, POC    Collection Time: 03/13/20  8:56 PM   Result Value Ref Range    Glucose (POC) 87 65 - 100 mg/dL           Assessment:     Active Problems:    BPH with obstruction/lower urinary tract symptoms (3/13/2020)      POD 1 s/p TURP. Doing well    Plan:     -Clamp CBI  -Regular diet  -SLIV  -OOB/Ambulate  -PO pain and anti-spasmodics PRN  -Dispo: Anticipated D/C tomorrow. Flo Schuler Arm, M.D.     [facility]  [place]  [address] [place] [address], [facility] [address]  Phone: [phone]  Fax: [phone]

## 2020-03-15 VITALS
WEIGHT: 217 LBS | RESPIRATION RATE: 17 BRPM | DIASTOLIC BLOOD PRESSURE: 68 MMHG | OXYGEN SATURATION: 96 % | SYSTOLIC BLOOD PRESSURE: 113 MMHG | HEIGHT: 72 IN | HEART RATE: 62 BPM | BODY MASS INDEX: 29.39 KG/M2 | TEMPERATURE: 97.8 F

## 2020-03-15 PROCEDURE — 74011250637 HC RX REV CODE- 250/637: Performed by: UROLOGY

## 2020-03-15 RX ORDER — HYDROCODONE BITARTRATE AND ACETAMINOPHEN 10; 325 MG/1; MG/1
1 TABLET ORAL
Qty: 20 TAB | Refills: 0 | Status: SHIPPED | OUTPATIENT
Start: 2020-03-15 | End: 2020-03-22

## 2020-03-15 RX ORDER — CEPHALEXIN 500 MG/1
500 CAPSULE ORAL 2 TIMES DAILY
Qty: 10 CAP | Refills: 0 | Status: SHIPPED | OUTPATIENT
Start: 2020-03-15 | End: 2020-03-16

## 2020-03-15 RX ADMIN — LEVOTHYROXINE SODIUM 125 MCG: 125 TABLET ORAL at 05:04

## 2020-03-15 RX ADMIN — Medication 10 ML: at 05:05

## 2020-03-15 RX ADMIN — CEPHALEXIN 500 MG: 500 CAPSULE ORAL at 09:04

## 2020-03-15 RX ADMIN — PANTOPRAZOLE SODIUM 40 MG: 40 TABLET, DELAYED RELEASE ORAL at 05:05

## 2020-03-15 RX ADMIN — MEMANTINE 10 MG: 5 TABLET ORAL at 09:17

## 2020-03-15 RX ADMIN — ASPIRIN 81 MG: 81 TABLET ORAL at 09:04

## 2020-03-15 NOTE — PROGRESS NOTES
Hourly rounds completed this shift. All needs met at this time. Bed low/locked. Call light within reach. Will continue to monitor and give bedside report to oncoming nurse. John removed this AM. Pt instantly had the urge to urinate. Urine pink/clear. No c/o pain.

## 2020-03-15 NOTE — PROGRESS NOTES
Problem: Patient Education: Go to Patient Education Activity  Goal: Patient/Family Education  Outcome: Progressing Towards Goal     Problem: TURP/TURB Pathway: Day of Surgery  Goal: Off Pathway (Use only if patient is Off Pathway)  Outcome: Progressing Towards Goal  Goal: Activity/Safety  Outcome: Progressing Towards Goal  Goal: Nutrition/Diet  Outcome: Progressing Towards Goal  Goal: Medications  Outcome: Progressing Towards Goal  Goal: Respiratory  Outcome: Progressing Towards Goal  Goal: Treatments/Interventions/Procedures  Outcome: Progressing Towards Goal  Goal: Psychosocial  Outcome: Progressing Towards Goal  Goal: *No signs and symptoms of infection  Outcome: Progressing Towards Goal  Goal: *Optimal pain control at patient's stated goal  Outcome: Progressing Towards Goal  Goal: *Adequate urinary output (equal to or greater than 30 milliliters/hour)  Outcome: Progressing Towards Goal  Goal: *Hemodynamically stable  Outcome: Progressing Towards Goal  Goal: *Tolerating diet  Outcome: Progressing Towards Goal  Goal: *Demonstrates progressive activity  Outcome: Progressing Towards Goal     Problem: TURP/TURB Pathway: Post-Op Day 1  Goal: Off Pathway (Use only if patient is Off Pathway)  Outcome: Progressing Towards Goal  Goal: Activity/Safety  Outcome: Progressing Towards Goal  Goal: Nutrition/Diet  Outcome: Progressing Towards Goal  Goal: Medications  Outcome: Progressing Towards Goal  Goal: Respiratory  Outcome: Progressing Towards Goal  Goal: Treatments/Interventions/Procedures  Outcome: Progressing Towards Goal  Goal: Psychosocial  Outcome: Progressing Towards Goal  Goal: *No signs and symptoms of infection  Outcome: Progressing Towards Goal  Goal: *Optimal pain control at patient's stated goal  Outcome: Progressing Towards Goal  Goal: *Adequate urinary output (equal to or greater than 30 milliliters/hour)  Outcome: Progressing Towards Goal  Goal: *Hemodynamically stable  Outcome: Progressing Towards Goal  Goal: *Tolerating diet  Outcome: Progressing Towards Goal  Goal: *Demonstrates progressive activity  Outcome: Progressing Towards Goal     Problem: TURP/TURB Pathway: Discharge Outcomes  Goal: *Hemodynamically stable  Outcome: Progressing Towards Goal  Goal: *Lungs clear or at baseline  Outcome: Progressing Towards Goal  Goal: *Demonstrates independent activity or return to baseline  Outcome: Progressing Towards Goal  Goal: *Optimal pain control at patient's stated goal  Outcome: Progressing Towards Goal  Goal: *Verbalizes understanding and describes prescribed diet  Outcome: Progressing Towards Goal  Goal: *Tolerating diet  Outcome: Progressing Towards Goal  Goal: *Verbalizes name, dosage, time, side effects, and number of days to continue medications  Outcome: Progressing Towards Goal  Goal: *No signs and symptoms of infection  Outcome: Progressing Towards Goal  Goal: *Anxiety reduced or absent  Outcome: Progressing Towards Goal  Goal: *Understands and describes signs and symptoms to report to providers(Stroke Metric)  Outcome: Progressing Towards Goal  Goal: *Describes follow-up/return visits to physicians  Outcome: Progressing Towards Goal  Goal: *Describes available resources and support systems  Outcome: Progressing Towards Goal  Goal: *Adequate urinary output (equal to or greater than 30 milliliters/hour)  Outcome: Progressing Towards Goal  Goal: *No evidence of hematuria or bloot clots in urine  Outcome: Progressing Towards Goal     Problem: Falls - Risk of  Goal: *Absence of Falls  Description: Document [place] Fall Risk and appropriate interventions in the flowsheet.   Outcome: Progressing Towards Goal  Note: Fall Risk Interventions:            Medication Interventions: Evaluate medications/consider consulting pharmacy, Patient to call before getting OOB, Teach patient to arise slowly                   Problem: Patient Education: Go to Patient Education Activity  Goal: Patient/Family Education  Outcome: Progressing Towards Goal

## 2020-03-15 NOTE — PROGRESS NOTES
Discharge instructions given. Education provided. All questions answered and verbally voiced understanding. Medication changes and follow up appointments discussed. Script sent to pharmacy and pt notified  AVS reviewed and E-signed. Copy provided for pt. Pt aware to call desk when ready to discharge.

## 2020-03-15 NOTE — DISCHARGE SUMMARY
Physician Discharge Summary    Name: Eneida Apple  Medical Record Number: [id number]       Account Number:  [de-identified]  YOB: 1960                         Age:  64 y.o. Admit date:  3/13/2020                    Discharge date:  3/15/2020    Attending Physician:  Clarissa Juan            Service: SURGERY    Physician Summary completed by: [place] Her. Ye Cancino MD    Reason for hospitalization: Urinary Retention / BPH    Significant PMH:   Past Medical History:   Diagnosis Date    Cardiac murmur     last echo-- 2007- at [address]--- normal per pt, has not seen cardiologist in over 10 years, stress test 10/2019, EF 54%    Celiac disease     Coronary atherosclerosis of unspecified type of vessel, native or graft 3/4/2014    Esophageal reflux 3/4/2014    GERD (gastroesophageal reflux disease)     Hypertension     x 9 yrs- controlled with meds    Hypertonicity of bladder 3/4/2014    Hypertrophy of prostate without urinary obstruction and other lower urinary tract symptoms (LUTS) 3/4/2014    Impotence of organic origin 3/4/2014    Osteoarthrosis, unspecified whether generalized or localized, unspecified site 3/4/2014    Other abnormal glucose 3/4/2014    Other and unspecified hyperlipidemia 3/4/2014    Other specified disorder of male genital organs(608.89) 3/4/2014    Prostatitis, unspecified 3/4/2014    Sleep apnea     c pap at hs    Thyroid disease     graves dse x 9yrs    Unspecified hereditary and idiopathic peripheral neuropathy 3/4/2014    Unspecified hypothyroidism 3/4/2014    Urinary frequency 3/4/2014       Allergies: Allergies   Allergen Reactions    Other Plant, Animal, Environmental Rash     Dial soap causes rash    Wheat Bran Other (comments)     blisters    Wheat Containing Prod Nausea and Vomiting       Brief Hospital Course: The patient was admitted and had the procedure listed below performed without difficulty. His hospital course progressed as expected.   No acute events occurred throughout his hospital stay. He was successfully weaned off CBI and catheter removed. He will be discharged when he voids or catheter replaced. He was discharged in stable condition. Admission Physical Exam notable for:    BPH    Admission Lab/Radiology studies notable for:   None    Surgical Procedures:  Cystoscopy, Bipolar Trans-urethral Resection of Prostate    Condition at Discharge: Stable    Discharge Diagnoses:     Benign prostatic hyperplasia with lower urinary tract symptoms, symptom details unspecified [N40.1]; BPH with obstruction/lower urinary tract symptoms [N40.1, N13.8]    Significant Diagnostic Studies and Procedures:  Noted in brief hospital course.     Consults:  None    Patient Disposition: home       Patient instructions/medications:    Current Facility-Administered Medications:     sodium chloride (NS) flush 5-40 mL, 5-40 mL, IntraVENous, Q8H, Flo Carrizales MD, 10 mL at 03/14/20 1332    sodium chloride (NS) flush 5-40 mL, 5-40 mL, IntraVENous, PRN, Constance Flores MD    aspirin delayed-release tablet 81 mg, 81 mg, Oral, DAILY, Shireen Dao MD, 81 mg at 03/14/20 0902    atorvastatin (LIPITOR) tablet 80 mg, 80 mg, Oral, QHS, Mauri Hightower MD, 80 mg at 03/14/20 2111    pantoprazole (PROTONIX) tablet 40 mg, 40 mg, Oral, DAILY, Mauri Hightower MD, 40 mg at 03/15/20 0505    levothyroxine (SYNTHROID) tablet 125 mcg, 125 mcg, Oral, ACB, Shireen Dao MD, 125 mcg at 03/15/20 0504    memantine (NAMENDA) tablet 10 mg, 10 mg, Oral, DAILY, Mauri Hightower MD, 10 mg at 03/14/20 0902    tamsulosin (FLOMAX) capsule 0.4 mg, 0.4 mg, Oral, QHS, Mauri Hightower MD, 0.4 mg at 03/14/20 2111    verapamil ER (CALAN-SR) tablet 240 mg, 240 mg, Oral, QHS, Mauri Hightower MD, 240 mg at 03/14/20 2111    sodium chloride (NS) flush 5-40 mL, 5-40 mL, IntraVENous, Q8H, Abundio Hightower MD, 10 mL at 03/15/20 0505    sodium chloride (NS) flush 5-40 mL, 5-40 mL, IntraVENous, PRN, Rice, Ross Angelo MD    acetaminophen (TYLENOL) tablet 650 mg, 650 mg, Oral, Q4H PRN, Matt Chopra MD, 650 mg at 03/14/20 0907    oxyCODONE-acetaminophen (PERCOCET) 5-325 mg per tablet 1 Tab, 1 Tab, Oral, Q4H PRN, Matt Chopra MD, 1 Tab at 03/13/20 1617    morphine injection 2 mg, 2 mg, IntraVENous, Q4H PRN, Ross Hightower MD    [facility]) injection 0.4 mg, 0.4 mg, IntraVENous, PRN, Matt Chopra MD    oxybutynin [facility]) tablet 5 mg, 5 mg, Oral, Q6H PRN, Matt Chopra MD, 5 mg at 03/13/20 1127    promethazine (PHENERGAN) with saline injection 12.5 mg, 12.5 mg, IntraVENous, Q6H PRN, Matt Chopra MD    diphenhydrAMINE (BENADRYL) capsule 25 mg, 25 mg, Oral, Q4H PRN, Matt Chopra MD    docusate sodium (COLACE) capsule 100 mg, 100 mg, Oral, BID, Ross Hightower MD    [facility] capsule 500 mg, 500 mg, Oral, QID, Matt Chopra MD, 500 mg at 03/14/20 2111    Pending items needing follow up: Steven Pinzon was instructed to follow up as indicated above. Signed:  Priya Grove. Aby Shelby M.D. [facility]  [address]  Phone: [phone]  Fax: [phone]    cc:  Primary Care Physician:  Verified  Referring physicians:     Additional provider(s):

## 2020-03-15 NOTE — DISCHARGE INSTRUCTIONS
DISCHARGE SUMMARY from Nurse    PATIENT INSTRUCTIONS:    After general anesthesia or intravenous sedation, for 24 hours or while taking prescription Narcotics:  · Limit your activities  · Do not drive and operate hazardous machinery  · Do not make important personal or business decisions  · Do  not drink alcoholic beverages  · If you have not urinated within 8 hours after discharge, please contact your surgeon on call. Report the following to your surgeon:  · Excessive pain, swelling, redness or odor of or around the surgical area  · Temperature over 100.5  · Nausea and vomiting lasting longer than 4 hours or if unable to take medications  · Any signs of decreased circulation or nerve impairment to extremity: change in color, persistent  numbness, tingling, coldness or increase pain  · Any questions    What to do at Home:  Recommended activity: Activity as tolerated,     If you experience any of the following symptoms decreased or no urine output, fever, increased pain that is unrelieved please follow up with urology or emergency room. *  Please give a list of your current medications to your Primary Care Provider. *  Please update this list whenever your medications are discontinued, doses are      changed, or new medications (including over-the-counter products) are added. *  Please carry medication information at all times in case of emergency situations. These are general instructions for a healthy lifestyle:    No smoking/ No tobacco products/ Avoid exposure to second hand smoke  Surgeon General's Warning:  Quitting smoking now greatly reduces serious risk to your health.     Obesity, smoking, and sedentary lifestyle greatly increases your risk for illness    A healthy diet, regular physical exercise & weight monitoring are important for maintaining a healthy lifestyle    You may be retaining fluid if you have a history of heart failure or if you experience any of the following symptoms:  Weight gain of 3 pounds or more overnight or 5 pounds in a week, increased swelling in our hands or feet or shortness of breath while lying flat in bed. Please call your doctor as soon as you notice any of these symptoms; do not wait until your next office visit. The discharge information has been reviewed with the patient. The patient verbalized understanding. Discharge medications reviewed with the patient and appropriate educational materials and side effects teaching were provided. ___________________________________________________________________________________________________________________________________     Transurethral Resection of the Prostate (TURP): What to Expect at [facility]    Transurethral resection of the prostate (TURP) is surgery to remove prostate tissue. It is done when an overgrown prostate gland is pressing on the urethra and making it hard for a man to urinate. You may need a urinary catheter for a short time. It is a flexible plastic tube used to drain urine from your bladder when you can't urinate on your own. If it is still in place when you go home, your doctor will give you instructions on how to care for your catheter. For several days after surgery, you may feel burning when you urinate. Your urine may be pink for 1 to 3 weeks after surgery. You also may have bladder cramps, or spasms. Your doctor may give you medicine to help control the spasms. You may still feel like you need to urinate often in the weeks after your surgery. It often takes up to 6 weeks for this to get better. After you have healed, you may have less trouble urinating. You may have better control over starting and stopping your urine stream. And you may feel like you get more relief when you urinate. Most men can return to work or many of their usual tasks in 1 to 3 weeks.  But for about 6 weeks, try to avoid heavy lifting and strenuous activities that might put extra pressure on your bladder. Most men still can have erections after surgery (if they were able to have them before surgery). But they may not ejaculate when they have an orgasm. Semen may go into the bladder instead of out through the penis. This is called retrograde ejaculation. It does not hurt and is not harmful to your health. This care sheet gives you a general idea about how long it will take for you to recover. But each person recovers at a different pace. Follow the steps below to get better as quickly as possible. How can you care for yourself at home? Activity    · Rest when you feel tired.     · Be active. Walking is a good choice.     · Allow your body to heal. Don't move quickly or lift anything heavy until you are feeling better.     · Ask your doctor when you can drive again.     · Many people are able to return to work within 1 to 3 weeks after surgery. It depends on the type of work you do and how you feel.     · Do not put anything in your rectum, such as an enema or suppository, for 4 to 6 weeks after the surgery.     · You may shower and take baths when your doctor says it is okay.     · Ask your doctor when it is okay for you to have sex. Diet    · You can eat your normal diet. If your stomach is upset, try bland, low-fat foods like plain rice, broiled chicken, toast, and yogurt.     · If your bowel movements are not regular right after surgery, try to avoid constipation and straining. Drink plenty of water. Your doctor may suggest fiber, a stool softener, or a mild laxative. Medicines    · Your doctor will tell you if and when you can restart your medicines. He or she will also give you instructions about taking any new medicines.     · If you take aspirin or some other blood thinner, be sure to talk to your doctor. He or she will tell you if and when to start taking those medicines again. Make sure that you understand exactly what your doctor wants you to do.     · Be safe with medicines.  Read and follow all instructions on the label. ? If the doctor gave you a prescription medicine for pain, take it as prescribed. ? If you are not taking a prescription pain medicine, ask your doctor if you can take an over-the-counter medicine.     · Take your antibiotics as directed. Do not stop taking them just because you feel better. You need to take the full course of antibiotics. Follow-up care is a key part of your treatment and safety. Be sure to make and go to all appointments, and call your doctor if you are having problems. It's also a good idea to know your test results and keep a list of the medicines you take. When should you call for help? Call 911 anytime you think you may need emergency care. For example, call if:    · You passed out (lost consciousness).     · You have chest pain, are short of breath, or cough up blood.    Call your doctor now or seek immediate medical care if:    · You have pain that does not get better after you take pain medicine.     · You have new or more blood clots in your urine. (It is normal for the urine to be pink for a few days.)     · You can't pass urine.     · You have symptoms of a urinary tract infection. These may include:  ? Pain or burning when you urinate. ? A frequent need to urinate without being able to pass much urine. ? Pain in the flank, which is just below the rib cage and above the waist on either side of the back. ? Blood in your urine. ? A fever.     · You are sick to your stomach or can't keep down fluids.     · You have signs of a blood clot in your leg (called a deep vein thrombosis), such as:  ? Pain in your calf, back of the knee, thigh, or groin. ? Redness or swelling in your leg.    Watch closely for changes in your health, and be sure to contact your doctor if you have problems. Where can you learn more?   Go to http://zohreh-damaris.info/  Enter G144 in the search box to learn more about \"Transurethral Resection of the Prostate (TURP): What to Expect at Home. \"  Current as of: May 28, 2019Content Version: 12.4  © 2294-8650 [employer]. Care instructions adapted under license by [employer] (which disclaims liability or warranty for this information). If you have questions about a medical condition or this instruction, always ask your healthcare professional. [address] any warranty or liability for your use of this information. Patient Education        Benign Prostatic Hyperplasia: Care Instructions  Your Care Instructions    Benign prostatic hyperplasia, or BPH, is an enlarged prostate gland. The prostate is a small gland that makes some of the fluid in semen. Prostate enlargement happens to almost all men as they age. It is usually not serious. BPH does not cause prostate cancer. As the prostate gets bigger, it may partly block the flow of urine. You may have a hard time getting a urine stream started or completely stopped. BPH can cause dribbling. You may have a weak urine stream, or you may have to urinate more often than you used to, especially at night. Most men find these problems easy to manage. You do not need treatment unless your symptoms bother you a lot or you have other problems, such as bladder infections or stones. In these cases, medicines may help. Surgery is not needed unless the urine flow is blocked or the symptoms do not get better with medicine. Follow-up care is a key part of your treatment and safety. Be sure to make and go to all appointments, and call your doctor if you are having problems. It's also a good idea to know your test results and keep a list of the medicines you take. How can you care for yourself at home? · Take plenty of time to urinate. Try to relax. · Try \"double voiding. \" Urinate as much you can, relax for a few moments, and then try to urinate again. · Sit on the toilet to urinate.   · Read or think of other things while you are waiting. · Turn on a faucet, or try to picture running water. Some men find that this helps get their urine flowing. · If dribbling is a problem, wash your penis daily to avoid skin irritation and infection. · Avoid caffeine and alcohol. These drinks will increase how often you need to urinate. Spread your fluid intake throughout the day. If the urge to urinate often wakes you at night, limit your fluid intake in the evening. Urinate right before you go to bed. · Many over-the-counter cold and allergy medicines can make the symptoms of BPH worse. Avoid antihistamines, decongestants, and allergy pills, if you can. Read the warnings on the package. · If you take any prescription medicines, especially tranquilizers or antidepressants, ask your doctor or pharmacist whether they can cause urination problems. There may be other medicines you can use that do not cause urinary problems. · Be safe with medicines. Take your medicines exactly as prescribed. Call your doctor if you think you are having a problem with your medicine. When should you call for help? Call your doctor now or seek immediate medical care if:    · You cannot urinate at all.     · You have symptoms of a urinary infection. For example:  ? You have blood or pus in your urine. ? You have pain in your back just below your rib cage. This is called flank pain. ? You have a fever, chills, or body aches. ? It hurts to urinate. ? You have groin or belly pain.    Watch closely for changes in your health, and be sure to contact your doctor if:    · It hurts when you ejaculate.     · Your urinary problems get a lot worse or bother you a lot. Where can you learn more? Go to http://zohreh-damaris.info/  Enter Q095 in the search box to learn more about \"Benign Prostatic Hyperplasia: Care Instructions. \"  Current as of: May 28, 2019Content Version: 12.4  © 5636-0920 [employer].   Care instructions adapted under license by [employer] (which disclaims liability or warranty for this information). If you have questions about a medical condition or this instruction, always ask your healthcare professional. [address] any warranty or liability for your use of this information. Patient Education        Learning About Transurethral Resection of the Prostate (TURP)  What is transurethral resection of the prostate (TURP)? Transurethral resection of the prostate (TURP) is surgery to remove some prostate tissue. It is done when an overgrown prostate gland is pressing on the urethra and making it hard for a man to urinate. The prostate gland is a small organ just below a man's bladder. It makes most of the fluid in semen. The urethra is the tube that carries urine from the bladder out of the body through the penis. It passes through the prostate. When the prostate gets too large, it can press on the urethra. TURP is done to take pressure off of the urethra. It can help you have better control over starting and stopping your urine stream. You may feel like you get more relief when you urinate. How is the surgery done? Your doctor will give you medicine to make you sleep or feel relaxed. You will be kept comfortable. If you are awake during the surgery, you will get medicine to numb you from the chest down. The doctor will put a thin, lighted tube, which is called a scope, into your urethra through the opening in your penis. Then the doctor will put small surgical tools or a tiny laser through the scope. He or she will then cut or burn away the section of the prostate that is blocking urine flow. When the surgery is finished, the doctor will take out the scope. What can you expect after the surgery? You may stay in the hospital for 1 to 2 days after the surgery. You may be able to go back to work and do many of your usual activities in 1 to 3 weeks.  But it is important to avoid heavy lifting or strenuous activities for about 6 weeks. If your surgery was done with a laser, you may feel better faster. Most men go home on the day of laser surgery, then see their doctor soon after. You may be able to go back to work and your usual activities after a few days. And you may be able to return to strenuous activity and heavy lifting after about 2 weeks. But talk to your doctor first.  Abby Parkinson may need a urinary catheter for a short time. This is a flexible plastic tube used to drain urine from your bladder when you can't urinate on your own. If it is still in place when you go home, your doctor will give you instructions for how to care for your catheter. You may still feel like you need to urinate often in the weeks after your surgery. It often takes up to 6 weeks for this to get better. After they recover from surgery, most men still can have erections (if they were able to have them before surgery). But they may not ejaculate when they have an orgasm. Semen may go into the bladder instead of out through the penis. This is called retrograde ejaculation. It does not hurt and is not harmful to your health. But it may mean that you will not be able to father a child. If this is a concern, talk to your doctor. You may be able to save your sperm before the surgery. Follow-up care is a key part of your treatment and safety. Be sure to make and go to all appointments, and call your doctor if you are having problems. It's also a good idea to know your test results and keep a list of the medicines you take. Where can you learn more? Go to http://zohreh-damaris.info/  Enter N024 in the search box to learn more about \"Learning About Transurethral Resection of the Prostate (TURP). \"  Current as of: May 28, 2019Content Version: 12.4  © 9520-7354 [employer]wise[employer] Inc[employer]. Care instructions adapted under license by [employer] (which disclaims liability or warranty for this information).  If you have questions about a medical condition or this instruction, always ask your healthcare professional. [address] any warranty or liability for your use of this information. Patient Education        Transurethral Resection of the Prostate (TURP): What to Expect at [facility]    Transurethral resection of the prostate (TURP) is surgery to remove prostate tissue. It is done when an overgrown prostate gland is pressing on the urethra and making it hard for a man to urinate. You may need a urinary catheter for a short time. It is a flexible plastic tube used to drain urine from your bladder when you can't urinate on your own. If it is still in place when you go home, your doctor will give you instructions on how to care for your catheter. For several days after surgery, you may feel burning when you urinate. Your urine may be pink for 1 to 3 weeks after surgery. You also may have bladder cramps, or spasms. Your doctor may give you medicine to help control the spasms. You may still feel like you need to urinate often in the weeks after your surgery. It often takes up to 6 weeks for this to get better. After you have healed, you may have less trouble urinating. You may have better control over starting and stopping your urine stream. And you may feel like you get more relief when you urinate. Most men can return to work or many of their usual tasks in 1 to 3 weeks. But for about 6 weeks, try to avoid heavy lifting and strenuous activities that might put extra pressure on your bladder. Most men still can have erections after surgery (if they were able to have them before surgery). But they may not ejaculate when they have an orgasm. Semen may go into the bladder instead of out through the penis. This is called retrograde ejaculation. It does not hurt and is not harmful to your health. This care sheet gives you a general idea about how long it will take for you to recover.  But each person recovers at a different pace. Follow the steps below to get better as quickly as possible. How can you care for yourself at home? Activity    · Rest when you feel tired.     · Be active. Walking is a good choice.     · Allow your body to heal. Don't move quickly or lift anything heavy until you are feeling better.     · Ask your doctor when you can drive again.     · Many people are able to return to work within 1 to 3 weeks after surgery. It depends on the type of work you do and how you feel.     · Do not put anything in your rectum, such as an enema or suppository, for 4 to 6 weeks after the surgery.     · You may shower and take baths when your doctor says it is okay.     · Ask your doctor when it is okay for you to have sex. Diet    · You can eat your normal diet. If your stomach is upset, try bland, low-fat foods like plain rice, broiled chicken, toast, and yogurt.     · If your bowel movements are not regular right after surgery, try to avoid constipation and straining. Drink plenty of water. Your doctor may suggest fiber, a stool softener, or a mild laxative. Medicines    · Your doctor will tell you if and when you can restart your medicines. He or she will also give you instructions about taking any new medicines.     · If you take aspirin or some other blood thinner, be sure to talk to your doctor. He or she will tell you if and when to start taking those medicines again. Make sure that you understand exactly what your doctor wants you to do.     · Be safe with medicines. Read and follow all instructions on the label. ? If the doctor gave you a prescription medicine for pain, take it as prescribed. ? If you are not taking a prescription pain medicine, ask your doctor if you can take an over-the-counter medicine.     · Take your antibiotics as directed. Do not stop taking them just because you feel better. You need to take the full course of antibiotics.    Follow-up care is a key part of your treatment and safety. Be sure to make and go to all appointments, and call your doctor if you are having problems. It's also a good idea to know your test results and keep a list of the medicines you take. When should you call for help? Call 911 anytime you think you may need emergency care. For example, call if:    · You passed out (lost consciousness).     · You have chest pain, are short of breath, or cough up blood.    Call your doctor now or seek immediate medical care if:    · You have pain that does not get better after you take pain medicine.     · You have new or more blood clots in your urine. (It is normal for the urine to be pink for a few days.)     · You can't pass urine.     · You have symptoms of a urinary tract infection. These may include:  ? Pain or burning when you urinate. ? A frequent need to urinate without being able to pass much urine. ? Pain in the flank, which is just below the rib cage and above the waist on either side of the back. ? Blood in your urine. ? A fever.     · You are sick to your stomach or can't keep down fluids.     · You have signs of a blood clot in your leg (called a deep vein thrombosis), such as:  ? Pain in your calf, back of the knee, thigh, or groin. ? Redness or swelling in your leg.    Watch closely for changes in your health, and be sure to contact your doctor if you have problems. Where can you learn more? Go to http://zohreh-damaris.info/  Enter R187 in the search box to learn more about \"Transurethral Resection of the Prostate (TURP): What to Expect at Home. \"  Current as of: May 28, 2019Content Version: 12.4  © 6465-6701 Healthwise, Incorporated. Care instructions adapted under license by [employer] (which disclaims liability or warranty for this information).  If you have questions about a medical condition or this instruction, always ask your healthcare professional. [employer]en Bang disclaims any warranty or liability for your use of this information.

## 2020-03-15 NOTE — PROGRESS NOTES
Urology Progress Note    Admit Date: 3/13/2020    Subjective:     Patient has no new complaints. Weaned off CBI. Tolerating regular diet. Ambulating. Pain controlled. Objective:     Patient Vitals for the past 8 hrs:   BP Temp Pulse Resp SpO2   03/15/20 0312 127/64 98.6 °F (37 °C) 63 17 98 %     No intake/output data recorded. 03/13 1901 - 03/15 0700  In: 6833 [P.O.:480; I.V.:2353]  Out: 9000 [Urine:9000]    Physical Exam:     Visit Vitals  /64 (BP 1 Location: Right arm, BP Patient Position: At rest)   Pulse 63   Temp 98.6 °F (37 °C)   Resp 17   Ht 5' 11.5\" (1.816 m)   Wt 217 lb (98.4 kg)   SpO2 98%   BMI 29.84 kg/m²        GENERAL: No acute distress, Awake, Alert, Oriented X 3, Gait normal  CARDIAC: regular rate and rhythm  CHEST AND LUNG: Easy work of breathing, clear to auscultation bilaterally, no cyanosis  ABDOMEN: soft, non tender, non-distended, positive bowel sounds, no organomegaly, no palpable masses, no guarding, no rebound tenderness  SKIN: No rash, no erythema, no lacerations or abrasions, no ecchymosis  NEUROLOGIC: cranial nerves 2-12 grossly intact           Data Review No results found for this or any previous visit (from the past 24 hour(s)). Assessment:     Active Problems:    BPH with obstruction/lower urinary tract symptoms (3/13/2020)      POD 2 s/p TURP. Doing well. Plan:     -Lopez out this AM  -Regular diet  -PO pain meds  -SLIV  -Dispo: D/C home today. Flo Melendez M.D.     [facility]  [facility]  [address] [place] [address], [address]  Phone: [phone]  Fax: [phone]

## 2020-03-15 NOTE — PROGRESS NOTES
Pt is for discharge home today with no needs/supportive care orders recieved for CM at this time.   Care Management Interventions  Mode of Transport at Discharge: Self  Transition of Care Consult (CM Consult): Discharge Planning  Discharge Durable Medical Equipment: No  Physical Therapy Consult: No  Occupational Therapy Consult: No  Speech Therapy Consult: No  Confirm Follow Up Transport: Self  Discharge Location  Discharge Placement: Home

## 2022-03-18 PROBLEM — G47.33 OSA (OBSTRUCTIVE SLEEP APNEA): Status: ACTIVE | Noted: 2018-03-06

## 2022-03-18 PROBLEM — N13.8 BPH WITH OBSTRUCTION/LOWER URINARY TRACT SYMPTOMS: Status: ACTIVE | Noted: 2020-03-13

## 2022-03-18 PROBLEM — N40.1 BPH WITH OBSTRUCTION/LOWER URINARY TRACT SYMPTOMS: Status: ACTIVE | Noted: 2020-03-13

## 2022-03-19 PROBLEM — N40.1 HYPERPLASIA OF PROSTATE WITH URINARY OBSTRUCTION: Status: ACTIVE | Noted: 2020-03-03

## 2022-03-19 PROBLEM — N13.8 HYPERPLASIA OF PROSTATE WITH URINARY OBSTRUCTION: Status: ACTIVE | Noted: 2020-03-03

## 2022-03-19 PROBLEM — G47.34 NOCTURNAL HYPOXEMIA: Status: ACTIVE | Noted: 2019-09-18

## 2022-03-19 PROBLEM — G47.10 HYPERSOMNOLENCE: Status: ACTIVE | Noted: 2018-03-06

## 2022-06-20 RX ORDER — DONEPEZIL HYDROCHLORIDE 5 MG/1
TABLET, FILM COATED ORAL
Qty: 90 TABLET | OUTPATIENT
Start: 2022-06-20

## 2022-07-13 RX ORDER — DONEPEZIL HYDROCHLORIDE 5 MG/1
TABLET, FILM COATED ORAL
Qty: 30 TABLET | Refills: 3 | Status: SHIPPED | OUTPATIENT
Start: 2022-07-13 | End: 2022-09-19

## 2022-07-25 RX ORDER — MEMANTINE HYDROCHLORIDE 10 MG/1
TABLET ORAL
Qty: 180 TABLET | Refills: 3 | Status: SHIPPED | OUTPATIENT
Start: 2022-07-25

## 2022-09-19 RX ORDER — DONEPEZIL HYDROCHLORIDE 5 MG/1
TABLET, FILM COATED ORAL
Qty: 90 TABLET | Refills: 4 | Status: SHIPPED | OUTPATIENT
Start: 2022-09-19

## 2022-09-19 NOTE — TELEPHONE ENCOUNTER
Request received electronically. Chart reviewed. .  Order pended in this encounter. Pharmacy up to date.     Scheduled F/U: none  Last Seen:    2/5/2021 / virtual

## 2023-01-17 LAB — PROSTATE SPECIFIC ANTIGEN: 0.8 NG/ML

## 2023-01-31 ENCOUNTER — OFFICE VISIT (OUTPATIENT)
Dept: UROLOGY | Age: 63
End: 2023-01-31
Payer: MEDICARE

## 2023-01-31 DIAGNOSIS — N52.9 ERECTILE DYSFUNCTION, UNSPECIFIED ERECTILE DYSFUNCTION TYPE: ICD-10-CM

## 2023-01-31 DIAGNOSIS — N43.40 SPERMATOCELE: ICD-10-CM

## 2023-01-31 DIAGNOSIS — R79.89 PROLACTIN INCREASED: ICD-10-CM

## 2023-01-31 DIAGNOSIS — R79.89 TSH ELEVATION: ICD-10-CM

## 2023-01-31 LAB
BILIRUBIN, URINE, POC: NEGATIVE
BLOOD URINE, POC: NEGATIVE
GLUCOSE URINE, POC: NEGATIVE
KETONES, URINE, POC: NEGATIVE
LEUKOCYTE ESTERASE, URINE, POC: NEGATIVE
NITRITE, URINE, POC: NEGATIVE
PH, URINE, POC: 7.5 (ref 4.6–8)
PROTEIN,URINE, POC: NEGATIVE
SPECIFIC GRAVITY, URINE, POC: 1.01 (ref 1–1.03)
URINALYSIS CLARITY, POC: NORMAL
URINALYSIS COLOR, POC: NORMAL
UROBILINOGEN, POC: NORMAL

## 2023-01-31 PROCEDURE — 3017F COLORECTAL CA SCREEN DOC REV: CPT | Performed by: UROLOGY

## 2023-01-31 PROCEDURE — G8484 FLU IMMUNIZE NO ADMIN: HCPCS | Performed by: UROLOGY

## 2023-01-31 PROCEDURE — 4004F PT TOBACCO SCREEN RCVD TLK: CPT | Performed by: UROLOGY

## 2023-01-31 PROCEDURE — 81003 URINALYSIS AUTO W/O SCOPE: CPT | Performed by: UROLOGY

## 2023-01-31 PROCEDURE — G8428 CUR MEDS NOT DOCUMENT: HCPCS | Performed by: UROLOGY

## 2023-01-31 PROCEDURE — 99214 OFFICE O/P EST MOD 30 MIN: CPT | Performed by: UROLOGY

## 2023-01-31 PROCEDURE — G8421 BMI NOT CALCULATED: HCPCS | Performed by: UROLOGY

## 2023-01-31 ASSESSMENT — ENCOUNTER SYMPTOMS
INDIGESTION: 1
EYES NEGATIVE: 1
RESPIRATORY NEGATIVE: 1
HEARTBURN: 1

## 2023-01-31 NOTE — PROGRESS NOTES
[facility]  [facility]    [address]  [place], [address]  [address]  : 1960    Chief Complaint   Patient presents with    Erectile Dysfunction          HPI     Moose Boy is a 63 y.o. male    The patient never tried the Trimix when I wrote it for him last visit. He is taking daily Cialis with poor results. He is receiving testosterone from the men's clinic twice a month. He brought in some lab results today. His PSA was 0.8 with a hemoglobin of 14.5. This was earlier this month. In addition he was noted to have an elevated TSH of 12.2 and an elevated prolactin of 30.3. He is not complaining of any headache or blurred vision. He saw another urologist in [place] back in October for pain in the right medial thigh. Scrotal ultrasound has revealed a small spermatocele on the right as well as a small hydrocele.   It was felt that his pain was probably musculoskeletal.      Past Medical History:   Diagnosis Date    Cardiac murmur     last echo-- - at [address]--- normal per pt, has not seen cardiologist in over 10 years, stress test 10/2019, EF 54%    Celiac disease     Coronary atherosclerosis of unspecified type of vessel, native or graft 3/4/2014    Esophageal reflux 3/4/2014    GERD (gastroesophageal reflux disease)     Hypertension     x 9 yrs- controlled with meds    Hypertonicity of bladder 3/4/2014    Hypertrophy of prostate without urinary obstruction and other lower urinary tract symptoms (LUTS) 3/4/2014    Impotence of organic origin 3/4/2014    Osteoarthrosis, unspecified whether generalized or localized, unspecified site 3/4/2014    Other abnormal glucose 3/4/2014    Other and unspecified hyperlipidemia 3/4/2014    Other specified disorder of male genital organs(608.89) 3/4/2014    Prostatitis, unspecified 3/4/2014    Sleep apnea     c pap at hs    Thyroid disease     graves dse x 9yrs    Unspecified hereditary and idiopathic peripheral neuropathy 3/4/2014    Unspecified hypothyroidism 3/4/2014    Urinary frequency 3/4/2014     Past Surgical History:   Procedure Laterality Date    CIRCUMCISION      ORTHOPEDIC SURGERY      bilat bunionectomy, then right foot screw removed    ORTHOPEDIC SURGERY      right shoulder surg x 3    ORTHOPEDIC SURGERY      left shoulder surg- 11/09    ORTHOPEDIC SURGERY Right     meniscus tearn repair    THYROIDECTOMY       Current Outpatient Medications   Medication Sig Dispense Refill    donepezil (ARICEPT) 5 MG tablet TAKE 1 TABLET BY MOUTH EVERY NIGHT 90 tablet 4    memantine (NAMENDA) 10 MG tablet TAKE 1 TABLET TWICE A DAY (CALL OFFICE TO SCHEDULE APPOINTMENT [phone]) 180 tablet 3    PROGESTERONE IM Inject into the muscle      Saw Palmetto, Serenoa repens, (SAW PALMETTO PO) Take by mouth daily      aspirin 81 MG EC tablet Take 81 mg by mouth daily      atorvastatin (LIPITOR) 80 MG tablet Nightly. dexlansoprazole (DEXILANT) 60 MG CPDR delayed release capsule Take by mouth daily      levothyroxine (SYNTHROID) 137 MCG tablet Take by mouth every morning (before breakfast)      meloxicam (MOBIC) 15 MG tablet Take 15 mg by mouth daily as needed      tadalafil (CIALIS) 5 MG tablet Take 5 mg by mouth daily      verapamil (CALAN SR) 240 MG extended release tablet Take by mouth       No current facility-administered medications for this visit.      Allergies   Allergen Reactions    Wheat Bran Other (See Comments)     blisters     Social History     Socioeconomic History    Marital status:      Spouse name: Not on file    Number of children: Not on file    Years of education: Not on file    Highest education level: Not on file   Occupational History    Not on file   Tobacco Use    Smoking status: Never    Smokeless tobacco: Never   Substance and Sexual Activity    Alcohol use: No    Drug use: No    Sexual activity: Not on file   Other Topics Concern    Not on file   Social History Narrative Not on file     Social Determinants of Health     Financial Resource Strain: Not on file   Food Insecurity: Not on file   Transportation Needs: Not on file   Physical Activity: Not on file   Stress: Not on file   Social Connections: Not on file   Intimate Partner Violence: Not on file   Housing Stability: Not on file     Family History   Problem Relation Age of Onset    Hypertension Maternal Grandfather     Hypertension Maternal Grandmother     High Cholesterol Mother        Review of Systems  Constitutional: Negative  Skin: Negative skin ROS  Eyes: Eyes negative  ENT: HENT negative  Respiratory: Respiratory negative  Cardiovascular: Neg cardio ROS  GI: Positive for indigestion and heartburn. Genitourinary: Positive for history of urolithiasis, frequent urination and erectile dysfunction. Musculoskeletal: Musculoskeletal negative  Neurological: Neg neuro ROS  Psychological: Neg psych ROS  Endocrine: Endocrine negative  Hem/Lymphatic: Hematologic/lymphatic negative    On exam the patient is in no distress. The abdomen is soft and nontender. Phallus is unremarkable both testicles are descended and normal in size, shape, consistency. I can feel a small spermatocele on the right and it is nontender. Rectal exam reveals a normal-sized prostate without any asymmetry, nodule, or induration.     Urinalysis  UA - Dipstick  Results for orders placed or performed in visit on 01/31/23   AMB POC URINALYSIS DIP STICK AUTO W/O MICRO   Result Value Ref Range    Color (UA POC)      Clarity (UA POC)      Glucose, Urine, POC Negative Negative    Bilirubin, Urine, POC Negative Negative    KETONES, Urine, POC Negative Negative    Specific Gravity, Urine, POC 1.015 1.001 - 1.035    Blood (UA POC) Negative Negative    pH, Urine, POC 7.5 4.6 - 8.0    Protein, Urine, POC Negative Negative    Urobilinogen, POC Normal     Nitrite, Urine, POC Negative Negative    Leukocyte Esterase, Urine, POC Negative Negative       UA - Micro  WBC - 0  RBC - 0  Bacteria - 0  Epith - 0        Assessment and Plan    ICD-10-CM    1. Erectile dysfunction, unspecified erectile dysfunction type  N52.9 AMB POC URINALYSIS DIP STICK AUTO W/O MICRO      2. Spermatocele  N43.40       3. TSH elevation  R79.89       4. Prolactin increased  R79.89         I wrote the patient another prescription for Trimix and he is going to bring this back with him around lunchtime on February 9 and I will give him his first injection. I will refer him to endocrinology regarding his TSH and prolactin.

## 2023-03-01 ENCOUNTER — OFFICE VISIT (OUTPATIENT)
Dept: ENDOCRINOLOGY | Age: 63
End: 2023-03-01
Payer: MEDICARE

## 2023-03-01 VITALS
WEIGHT: 203 LBS | OXYGEN SATURATION: 96 % | SYSTOLIC BLOOD PRESSURE: 116 MMHG | DIASTOLIC BLOOD PRESSURE: 70 MMHG | HEART RATE: 63 BPM

## 2023-03-01 DIAGNOSIS — E22.1 HYPERPROLACTINEMIA (HCC): ICD-10-CM

## 2023-03-01 DIAGNOSIS — E89.0 HYPOTHYROIDISM FOLLOWING RADIOIODINE THERAPY: ICD-10-CM

## 2023-03-01 PROCEDURE — 99204 OFFICE O/P NEW MOD 45 MIN: CPT | Performed by: INTERNAL MEDICINE

## 2023-03-01 PROCEDURE — G8427 DOCREV CUR MEDS BY ELIG CLIN: HCPCS | Performed by: INTERNAL MEDICINE

## 2023-03-01 PROCEDURE — G8484 FLU IMMUNIZE NO ADMIN: HCPCS | Performed by: INTERNAL MEDICINE

## 2023-03-01 PROCEDURE — G8421 BMI NOT CALCULATED: HCPCS | Performed by: INTERNAL MEDICINE

## 2023-03-01 PROCEDURE — 3017F COLORECTAL CA SCREEN DOC REV: CPT | Performed by: INTERNAL MEDICINE

## 2023-03-01 PROCEDURE — 4004F PT TOBACCO SCREEN RCVD TLK: CPT | Performed by: INTERNAL MEDICINE

## 2023-03-01 RX ORDER — LEVOTHYROXINE SODIUM 150 MCG
150 TABLET ORAL DAILY
Qty: 90 TABLET | Refills: 3
Start: 2023-03-01

## 2023-03-01 ASSESSMENT — ENCOUNTER SYMPTOMS
DIARRHEA: 0
ROS SKIN COMMENTS: DENIES HAIR LOSS, DRY SKIN.
CONSTIPATION: 1

## 2023-03-01 NOTE — PROGRESS NOTES
Ty Carrasco MD, [facility]  [address], [address] [facility], [address] [place] [address]  Phone [phone]  Facsimile [phone]          Rosa Gambino is a 63 y.o. male seen 3/1/2023 at the request of Dr. Darryl New for the evaluation of TSH elevation and prolactin increased        ASSESSMENT AND PLAN:    1. Hypothyroidism following radioiodine therapy  His hypothyroidism has not been well controlled. I discovered today that he has been taking his Synthroid with other medications and vitamins, which is likely led to inconsistent absorption. He was instructed regarding proper way to take thyroid hormone replacement (in the morning, on empty stomach, with water only, wait at least 30-60 minutes prior to eating, drinking or taking other medications). I will have him follow-up in 2 months.    - SYNTHROID 150 MCG tablet; Take 1 tablet by mouth Daily  Dispense: 90 tablet; Refill: 3    2. Hyperprolactinemia ([address]  She was noted to have a mildly elevated serum prolactin level 1/2023. His previous prolactin level was normal 3/2022. I therefore suspect his mild hyperprolactinemia is secondary to uncontrolled hypothyroidism. I will repeat his prolactin level prior to his follow-up visit. If his prolactin level remains significantly elevated after correction of his hypothyroidism, then I will consider pituitary imaging. Follow-up and Dispositions    Return in about 2 months (around 5/1/2023), or Friday afternoon is okay. HISTORY OF PRESENT ILLNESS:    THYROID DISEASE    Presentation/Diagnosis: Graves' disease status post US-131 in 2001. Symptoms: See review of systems. Treatment: He previously took Levoxyl and then was switched to Synthroid within the past year. He states that his levels have uncontrolled since he switched to Synthroid. He has been on the current dose of Synthroid 150 mcg daily for the past 6 months.   Takes name brand in AM with other medications, including vitamins. Imaging: None. Labs:  11/20/2020: TSH 0.437.  3/28/2022: TSH 5.440.  1/17/2023: TSH 12.200 (on Synthroid 150 mcg daily). PITUITARY DISEASE    Presentation/Diagnosis/Treatment: He is prescribed testosterone replacement therapy from the [facility] and was noted to have an elevated prolactin level. He is followed by urology for erectile dysfunction. Symptoms: Denies unusual headaches, tunnel vision, diplopia. Denies galactorrhea, breast swelling/tenderness. Denies decreased libido. He has issues with erectile dysfunction. Cialis helps somewhat but is not usually effective enough. He has not tried TriMix. Treatment: Testosterone cypionate administered every week at the [facility]. Imaging: None. Labs:  3/28/2022: Prolactin 18.2  1/17/2023: Prolactin 30.3. Review of Systems   Constitutional:  Negative for diaphoresis, fatigue and unexpected weight change. Cardiovascular:  Positive for palpitations (occasionally). Gastrointestinal:  Positive for constipation (occasionally). Negative for diarrhea. Endocrine: Negative for cold intolerance and heat intolerance. Skin:         Denies hair loss, dry skin. Neurological:  Negative for tremors. Vital Signs:  /70   Pulse 63   Wt 203 lb (92.1 kg)   SpO2 96%     Physical Exam  Constitutional:       General: He is not in acute distress. Neck:      Thyroid: No thyroid mass or thyromegaly. Cardiovascular:      Rate and Rhythm: Normal rate and regular rhythm. Lymphadenopathy:      Cervical: No cervical adenopathy. Neurological:      Motor: No tremor.        Orders Placed This Encounter   Procedures    TSH with Reflex     Standing Status:   Future     Standing Expiration Date:   3/1/2024    Prolactin     Standing Status:   Future     Standing Expiration Date:   3/1/2024         Current Outpatient Medications   Medication Sig Dispense Refill    SYNTHROID 150 MCG tablet Take 1 tablet by mouth Daily 90 tablet 3    donepezil (ARICEPT) 5 MG tablet TAKE 1 TABLET BY MOUTH EVERY NIGHT 90 tablet 4    memantine (NAMENDA) 10 MG tablet TAKE 1 TABLET TWICE A DAY (CALL OFFICE TO SCHEDULE APPOINTMENT [phone]) 180 tablet 3    PROGESTERONE IM Inject into the muscle      Saw Palmetto, Serenoa repens, (SAW PALMETTO PO) Take by mouth daily      atorvastatin (LIPITOR) 80 MG tablet Nightly. dexlansoprazole (DEXILANT) 60 MG CPDR delayed release capsule Take by mouth daily      meloxicam (MOBIC) 15 MG tablet Take 15 mg by mouth daily as needed      tadalafil (CIALIS) 5 MG tablet Take 5 mg by mouth daily      verapamil (CALAN SR) 240 MG extended release tablet Take by mouth       No current facility-administered medications for this visit.

## 2023-04-27 ENCOUNTER — OFFICE VISIT (OUTPATIENT)
Dept: ENDOCRINOLOGY | Age: 63
End: 2023-04-27
Payer: MEDICARE

## 2023-04-27 VITALS
WEIGHT: 204 LBS | SYSTOLIC BLOOD PRESSURE: 122 MMHG | HEART RATE: 74 BPM | DIASTOLIC BLOOD PRESSURE: 64 MMHG | OXYGEN SATURATION: 99 %

## 2023-04-27 DIAGNOSIS — E22.1 HYPERPROLACTINEMIA (HCC): ICD-10-CM

## 2023-04-27 DIAGNOSIS — E89.0 HYPOTHYROIDISM FOLLOWING RADIOIODINE THERAPY: Primary | ICD-10-CM

## 2023-04-27 PROCEDURE — G8427 DOCREV CUR MEDS BY ELIG CLIN: HCPCS | Performed by: INTERNAL MEDICINE

## 2023-04-27 PROCEDURE — 3017F COLORECTAL CA SCREEN DOC REV: CPT | Performed by: INTERNAL MEDICINE

## 2023-04-27 PROCEDURE — 4004F PT TOBACCO SCREEN RCVD TLK: CPT | Performed by: INTERNAL MEDICINE

## 2023-04-27 PROCEDURE — 99214 OFFICE O/P EST MOD 30 MIN: CPT | Performed by: INTERNAL MEDICINE

## 2023-04-27 PROCEDURE — G8421 BMI NOT CALCULATED: HCPCS | Performed by: INTERNAL MEDICINE

## 2023-04-27 RX ORDER — LEVOTHYROXINE SODIUM 150 MCG
150 TABLET ORAL DAILY
Qty: 90 TABLET | Refills: 3 | Status: SHIPPED | OUTPATIENT
Start: 2023-04-27

## 2023-04-27 ASSESSMENT — ENCOUNTER SYMPTOMS
DIARRHEA: 1
CONSTIPATION: 0
ROS SKIN COMMENTS: DENIES HAIR LOSS, DRY SKIN.

## 2023-04-27 NOTE — PROGRESS NOTES
Ty Jeronimo MD, [facility]  [address], [address] [facility], [address] [place] [address] [place]  Phone [phone]  Facsimile [phone]          Nicole Hercules is a 63 y.o. male seen 4/27/2023 for follow-up of hypothyroidism and hyperprolactinemia        ASSESSMENT AND PLAN:    1. Hypothyroidism following radioiodine therapy  He is now clinically and biochemically euthyroid since he started taking his thyroid hormone replacement correctly. I encouraged him to continue to take his Synthroid as instructed. Follow-up in 6 months.    - SYNTHROID 150 MCG tablet; Take 1 tablet by mouth Daily  Dispense: 90 tablet; Refill: 3    2. Hyperprolactinemia ([address]  She was noted to have a mildly elevated serum prolactin level 1/2023. His previous prolactin level was normal 3/2022. I therefore suspected that his mild hyperprolactinemia was secondary to uncontrolled hypothyroidism. His prolactin level has normalized now that he is euthyroid. I will not pursue any further work-up. Follow-up and Dispositions    Return in about 6 months (around 10/27/2023). HISTORY OF PRESENT ILLNESS:    THYROID DISEASE    Presentation/Diagnosis: Graves' disease status post US-131 in 2001. Symptoms: See review of systems. Treatment: Takes name brand in AM correctly. Imaging: None. Labs:  11/20/2020: TSH 0.437.  3/28/2022: TSH 5.440.  1/17/2023: TSH 12.200 (on Synthroid 150 mcg daily). 4/21/2023: TSH 2.990. PITUITARY DISEASE    Presentation/Diagnosis/Treatment: He is prescribed testosterone replacement therapy from the [facility] and was noted to have an elevated prolactin level. He is followed by urology for erectile dysfunction. Symptoms: Denies unusual headaches, tunnel vision, diplopia. Denies galactorrhea, breast swelling/tenderness. Denies decreased libido. He has issues with erectile dysfunction.   Cialis helps somewhat but is not usually

## 2023-10-30 ENCOUNTER — OFFICE VISIT (OUTPATIENT)
Dept: ENDOCRINOLOGY | Age: 63
End: 2023-10-30
Payer: MEDICARE

## 2023-10-30 VITALS
SYSTOLIC BLOOD PRESSURE: 126 MMHG | OXYGEN SATURATION: 98 % | DIASTOLIC BLOOD PRESSURE: 80 MMHG | WEIGHT: 205 LBS | HEART RATE: 57 BPM

## 2023-10-30 DIAGNOSIS — E22.1 HYPERPROLACTINEMIA (HCC): ICD-10-CM

## 2023-10-30 DIAGNOSIS — E89.0 HYPOTHYROIDISM FOLLOWING RADIOIODINE THERAPY: ICD-10-CM

## 2023-10-30 DIAGNOSIS — E89.0 HYPOTHYROIDISM FOLLOWING RADIOIODINE THERAPY: Primary | ICD-10-CM

## 2023-10-30 PROCEDURE — 4004F PT TOBACCO SCREEN RCVD TLK: CPT | Performed by: INTERNAL MEDICINE

## 2023-10-30 PROCEDURE — 3017F COLORECTAL CA SCREEN DOC REV: CPT | Performed by: INTERNAL MEDICINE

## 2023-10-30 PROCEDURE — G8484 FLU IMMUNIZE NO ADMIN: HCPCS | Performed by: INTERNAL MEDICINE

## 2023-10-30 PROCEDURE — G8427 DOCREV CUR MEDS BY ELIG CLIN: HCPCS | Performed by: INTERNAL MEDICINE

## 2023-10-30 PROCEDURE — G8421 BMI NOT CALCULATED: HCPCS | Performed by: INTERNAL MEDICINE

## 2023-10-30 PROCEDURE — 99213 OFFICE O/P EST LOW 20 MIN: CPT | Performed by: INTERNAL MEDICINE

## 2023-10-30 RX ORDER — LEVOTHYROXINE SODIUM 150 MCG
150 TABLET ORAL DAILY
Qty: 90 TABLET | Refills: 3 | Status: SHIPPED | OUTPATIENT
Start: 2023-10-30

## 2023-10-30 ASSESSMENT — ENCOUNTER SYMPTOMS
ROS SKIN COMMENTS: DENIES HAIR LOSS, DRY SKIN.
DIARRHEA: 0
CONSTIPATION: 0

## 2023-10-30 NOTE — PROGRESS NOTES
palpitations. Gastrointestinal:  Negative for constipation and diarrhea. Endocrine: Negative for cold intolerance and heat intolerance. Skin:         Denies hair loss, dry skin. Neurological:  Negative for tremors. Vital Signs:  /80   Pulse 57   Wt 93 kg (205 lb)   SpO2 98%     Wt Readings from Last 3 Encounters:   10/30/23 93 kg (205 lb)   04/27/23 92.5 kg (204 lb)   03/01/23 92.1 kg (203 lb)       Physical Exam  Constitutional:       General: He is not in acute distress. Neck:      Thyroid: No thyroid mass or thyromegaly. Cardiovascular:      Rate and Rhythm: Normal rate and regular rhythm. Lymphadenopathy:      Cervical: No cervical adenopathy. Neurological:      Motor: No tremor. Orders Placed This Encounter   Procedures    TSH with Reflex     Standing Status:   Future     Standing Expiration Date:   10/30/2024    TSH with Reflex     Standing Status:   Future     Standing Expiration Date:   4/30/2025         Current Outpatient Medications   Medication Sig Dispense Refill    SYNTHROID 150 MCG tablet Take 1 tablet by mouth Daily 90 tablet 3    donepezil (ARICEPT) 5 MG tablet TAKE 1 TABLET BY MOUTH EVERY NIGHT 90 tablet 4    memantine (NAMENDA) 10 MG tablet TAKE 1 TABLET TWICE A DAY (CALL OFFICE TO SCHEDULE APPOINTMENT [phone]) 180 tablet 3    PROGESTERONE IM Inject into the muscle      Saw Palmetto, Serenoa repens, (SAW PALMETTO PO) Take by mouth daily      atorvastatin (LIPITOR) 80 MG tablet Nightly. dexlansoprazole (DEXILANT) 60 MG CPDR delayed release capsule Take by mouth daily      meloxicam (MOBIC) 15 MG tablet Take 1 tablet by mouth daily as needed      tadalafil (CIALIS) 5 MG tablet Take 1 tablet by mouth daily      verapamil (CALAN SR) 240 MG extended release tablet Take by mouth       No current facility-administered medications for this visit.

## 2023-10-31 LAB — TSH W FREE THYROID IF ABNORMAL: 2.56 UIU/ML (ref 0.36–3.74)

## 2024-01-03 ENCOUNTER — OFFICE VISIT (OUTPATIENT)
Dept: UROLOGY | Age: 64
End: 2024-01-03
Payer: MEDICARE

## 2024-01-03 DIAGNOSIS — N52.9 ERECTILE DYSFUNCTION, UNSPECIFIED ERECTILE DYSFUNCTION TYPE: Primary | ICD-10-CM

## 2024-01-03 DIAGNOSIS — E29.1 HYPOGONADISM IN MALE: ICD-10-CM

## 2024-01-03 LAB
BILIRUBIN, URINE, POC: NEGATIVE
BLOOD URINE, POC: NORMAL
GLUCOSE URINE, POC: NEGATIVE
KETONES, URINE, POC: NEGATIVE
LEUKOCYTE ESTERASE, URINE, POC: NEGATIVE
NITRITE, URINE, POC: NEGATIVE
PH, URINE, POC: 6.5 (ref 4.6–8)
PROTEIN,URINE, POC: NEGATIVE
SPECIFIC GRAVITY, URINE, POC: 1.02 (ref 1–1.03)
URINALYSIS CLARITY, POC: NORMAL
URINALYSIS COLOR, POC: NORMAL
UROBILINOGEN, POC: NORMAL

## 2024-01-03 PROCEDURE — G8427 DOCREV CUR MEDS BY ELIG CLIN: HCPCS | Performed by: UROLOGY

## 2024-01-03 PROCEDURE — 4004F PT TOBACCO SCREEN RCVD TLK: CPT | Performed by: UROLOGY

## 2024-01-03 PROCEDURE — G8421 BMI NOT CALCULATED: HCPCS | Performed by: UROLOGY

## 2024-01-03 PROCEDURE — 3017F COLORECTAL CA SCREEN DOC REV: CPT | Performed by: UROLOGY

## 2024-01-03 PROCEDURE — 81003 URINALYSIS AUTO W/O SCOPE: CPT | Performed by: UROLOGY

## 2024-01-03 PROCEDURE — G8484 FLU IMMUNIZE NO ADMIN: HCPCS | Performed by: UROLOGY

## 2024-01-03 PROCEDURE — 99214 OFFICE O/P EST MOD 30 MIN: CPT | Performed by: UROLOGY

## 2024-01-03 ASSESSMENT — ENCOUNTER SYMPTOMS
NAUSEA: 0
BACK PAIN: 0

## 2024-01-03 NOTE — PROGRESS NOTES
[facility]  [address]  [place] [address]  [phone]          Vladislav Thorpe  : 1960    Chief Complaint   Patient presents with    Follow-up     1 yr f/u ED/Prostate          HPI     Vladislav Thorpe is a 63 y.o. male    The patient continues to have erectile dysfunction.  He got his prescription for Trimix but is yet to inject himself.  He did bring his medicine with him today.  He continues to get weekly testosterone injections at the [facility] clinic.  PSA was 0.8 in 2023.  There is an interesting history that he had Testopel placed and apparently got some sort of scarring or knot that was very uncomfortable and would not resolve with time.  He informs me he had a surgeon remove them.  In spite of this he would like to try Testopel again.  Previously he had to pay for this out-of-pocket.  He has seen endocrinology and his last prolactin was within the normal range.  He is being treated for thyroid disease.      Past Medical History:   Diagnosis Date    Cardiac murmur     [facility], has not seen cardiologist in over 10 years, stress test 10/2019, EF 54%    Celiac disease     Coronary atherosclerosis of unspecified type of vessel, native or graft 3/4/2014    Esophageal reflux 3/4/2014    GERD (gastroesophageal reflux disease)     Hypertension     x 9 yrs- controlled with meds    Hypertonicity of bladder 3/4/2014    Hypertrophy of prostate without urinary obstruction and other lower urinary tract symptoms (LUTS) 3/4/2014    Impotence of organic origin 3/4/2014    Osteoarthrosis, unspecified whether generalized or localized, unspecified site 3/4/2014    Other abnormal glucose 3/4/2014    Other and unspecified hyperlipidemia 3/4/2014    Other specified disorder of male genital organs(608.89) 3/4/2014    Prostatitis, unspecified 3/4/2014    Sleep apnea     c pap at     Thyroid disease     graves dse x 9yrs

## 2024-07-18 ENCOUNTER — OFFICE VISIT (OUTPATIENT)
Dept: UROLOGY | Age: 64
End: 2024-07-18
Payer: MEDICARE

## 2024-07-18 DIAGNOSIS — E29.1 HYPOGONADISM IN MALE: ICD-10-CM

## 2024-07-18 DIAGNOSIS — N52.9 ERECTILE DYSFUNCTION, UNSPECIFIED ERECTILE DYSFUNCTION TYPE: Primary | ICD-10-CM

## 2024-07-18 PROCEDURE — 4004F PT TOBACCO SCREEN RCVD TLK: CPT | Performed by: UROLOGY

## 2024-07-18 PROCEDURE — G8427 DOCREV CUR MEDS BY ELIG CLIN: HCPCS | Performed by: UROLOGY

## 2024-07-18 PROCEDURE — 99214 OFFICE O/P EST MOD 30 MIN: CPT | Performed by: UROLOGY

## 2024-07-18 PROCEDURE — 3017F COLORECTAL CA SCREEN DOC REV: CPT | Performed by: UROLOGY

## 2024-07-18 PROCEDURE — G8421 BMI NOT CALCULATED: HCPCS | Performed by: UROLOGY

## 2024-07-18 ASSESSMENT — ENCOUNTER SYMPTOMS
NAUSEA: 0
BACK PAIN: 0

## 2024-07-18 NOTE — PROGRESS NOTES
Negative for back pain.      Urinalysis  UA - Dipstick  Results for orders placed or performed in visit on 01/03/24   AMB POC URINALYSIS DIP STICK AUTO W/O MICRO   Result Value Ref Range    Color (UA POC)      Clarity (UA POC)      Glucose, Urine, POC Negative     Bilirubin, Urine, POC Negative     KETONES, Urine, POC Negative     Specific Gravity, Urine, POC 1.020 1.001 - 1.035    Blood (UA POC) Trace-intact     pH, Urine, POC 6.5 4.6 - 8.0    Protein, Urine, POC Negative     Urobilinogen, POC 0.2 mg/dL     Nitrite, Urine, POC Negative     Leukocyte Esterase, Urine, POC Negative        UA - Micro  WBC - 0  RBC - 0  Bacteria - 0  Epith - 0        Assessment and Plan    ICD-10-CM    1. Erectile dysfunction, unspecified erectile dysfunction type  N52.9       2. Hypogonadism in male  E29.1         I wrote him a prescription for super Trimix and told him to start out of the dose of 0.3 cc.  We also had a lengthy conversation regarding penile prosthesis including the technique and risks of the procedure.  I gave him a brochure describing IPP.  He will follow-up with me in 3 months.

## 2024-10-17 ENCOUNTER — OFFICE VISIT (OUTPATIENT)
Dept: UROLOGY | Age: 64
End: 2024-10-17
Payer: MEDICARE

## 2024-10-17 ENCOUNTER — TELEPHONE (OUTPATIENT)
Dept: UROLOGY | Age: 64
End: 2024-10-17

## 2024-10-17 DIAGNOSIS — E29.1 HYPOGONADISM IN MALE: ICD-10-CM

## 2024-10-17 DIAGNOSIS — N52.9 ERECTILE DYSFUNCTION, UNSPECIFIED ERECTILE DYSFUNCTION TYPE: Primary | ICD-10-CM

## 2024-10-17 DIAGNOSIS — F52.4 PREMATURE EJACULATION: ICD-10-CM

## 2024-10-17 LAB
BILIRUBIN, URINE, POC: NEGATIVE
BLOOD URINE, POC: NEGATIVE
GLUCOSE URINE, POC: NEGATIVE MG/DL
KETONES, URINE, POC: NEGATIVE MG/DL
LEUKOCYTE ESTERASE, URINE, POC: NEGATIVE
NITRITE, URINE, POC: NEGATIVE
PH, URINE, POC: 7 (ref 4.6–8)
PROTEIN,URINE, POC: NEGATIVE MG/DL
SPECIFIC GRAVITY, URINE, POC: 1.01 (ref 1–1.03)
URINALYSIS CLARITY, POC: NORMAL
URINALYSIS COLOR, POC: NORMAL
UROBILINOGEN, POC: NORMAL MG/DL

## 2024-10-17 PROCEDURE — G8421 BMI NOT CALCULATED: HCPCS | Performed by: UROLOGY

## 2024-10-17 PROCEDURE — 1036F TOBACCO NON-USER: CPT | Performed by: UROLOGY

## 2024-10-17 PROCEDURE — 99214 OFFICE O/P EST MOD 30 MIN: CPT | Performed by: UROLOGY

## 2024-10-17 PROCEDURE — G8484 FLU IMMUNIZE NO ADMIN: HCPCS | Performed by: UROLOGY

## 2024-10-17 PROCEDURE — 81003 URINALYSIS AUTO W/O SCOPE: CPT | Performed by: UROLOGY

## 2024-10-17 PROCEDURE — 3017F COLORECTAL CA SCREEN DOC REV: CPT | Performed by: UROLOGY

## 2024-10-17 PROCEDURE — G8427 DOCREV CUR MEDS BY ELIG CLIN: HCPCS | Performed by: UROLOGY

## 2024-10-17 RX ORDER — CITALOPRAM HYDROBROMIDE 20 MG/1
20 TABLET ORAL DAILY
Qty: 90 TABLET | Refills: 5 | Status: SHIPPED | OUTPATIENT
Start: 2024-10-17 | End: 2024-10-17 | Stop reason: SDUPTHER

## 2024-10-17 RX ORDER — CITALOPRAM HYDROBROMIDE 20 MG/1
20 TABLET ORAL DAILY
Qty: 90 TABLET | Refills: 5 | Status: SHIPPED | OUTPATIENT
Start: 2024-10-17

## 2024-10-17 ASSESSMENT — ENCOUNTER SYMPTOMS
BACK PAIN: 0
NAUSEA: 0

## 2024-10-17 NOTE — TELEPHONE ENCOUNTER
Pt calling stating his Rx for citalopram (CELEXA) 20 MG was sent to [facility] and needs to be sent to [facility] in [facility]. Informed Pt that Dr Peacock is in clinic still and will advise once able. Pt verbalized understanding of information given.

## 2024-10-17 NOTE — TELEPHONE ENCOUNTER
Called to inform Pt that Dr Peacock has canceled the old order and resent the request to [facility], [place] [facility] [address] [facility]  Pt verbalized understanding of information given and thanked me for following up.

## 2024-10-17 NOTE — PROGRESS NOTES
[facility]  [address] [address]  [phone]          Vladislav Thorpe  : 1960    Chief Complaint   Patient presents with    3 Month Follow-Up          HPI     Vladislav Thorpe is a 63 y.o. male    Patient is yet to obtain super Trimix.  Today he tells me that he can actually get a good erection but his problem is really premature ejaculation.  I went through what the actual problem is in great detail multiple times today to make sure that we were communicating effectively.    Past Medical History:   Diagnosis Date    Cardiac murmur     last echo-- - at [facility]--- normal per pt, has not seen cardiologist in over 10 years, stress test 10/2019, EF 54%    Celiac disease     Coronary atherosclerosis of unspecified type of vessel, native or graft 3/4/2014    Esophageal reflux 3/4/2014    GERD (gastroesophageal reflux disease)     Hypertension     x 9 yrs- controlled with meds    Hypertonicity of bladder 3/4/2014    Hypertrophy of prostate without urinary obstruction and other lower urinary tract symptoms (LUTS) 3/4/2014    Impotence of organic origin 3/4/2014    Osteoarthrosis, unspecified whether generalized or localized, unspecified site 3/4/2014    Other abnormal glucose 3/4/2014    Other and unspecified hyperlipidemia 3/4/2014    Other specified disorder of male genital organs(608.89) 3/4/2014    Prostatitis, unspecified 3/4/2014    Sleep apnea     c pap at hs    Thyroid disease     graves dse x 9yrs    Unspecified hereditary and idiopathic peripheral neuropathy 3/4/2014    Unspecified hypothyroidism 3/4/2014    Urinary frequency 3/4/2014     Past Surgical History:   Procedure Laterality Date    CIRCUMCISION      CYSTOSCOPY      ORTHOPEDIC SURGERY      bilat bunionectomy, then right foot screw removed    ORTHOPEDIC SURGERY      right shoulder surg x 3    ORTHOPEDIC SURGERY      left shoulder surg-     ORTHOPEDIC SURGERY Right     meniscus

## 2024-10-30 ENCOUNTER — OFFICE VISIT (OUTPATIENT)
Dept: ENDOCRINOLOGY | Age: 64
End: 2024-10-30
Payer: MEDICARE

## 2024-10-30 VITALS
OXYGEN SATURATION: 98 % | WEIGHT: 208.2 LBS | SYSTOLIC BLOOD PRESSURE: 132 MMHG | HEART RATE: 79 BPM | DIASTOLIC BLOOD PRESSURE: 82 MMHG

## 2024-10-30 DIAGNOSIS — E89.0 HYPOTHYROIDISM FOLLOWING RADIOIODINE THERAPY: ICD-10-CM

## 2024-10-30 DIAGNOSIS — E89.0 HYPOTHYROIDISM FOLLOWING RADIOIODINE THERAPY: Primary | ICD-10-CM

## 2024-10-30 LAB — TSH W FREE THYROID IF ABNORMAL: 2.94 UIU/ML (ref 0.27–4.2)

## 2024-10-30 PROCEDURE — G8484 FLU IMMUNIZE NO ADMIN: HCPCS | Performed by: INTERNAL MEDICINE

## 2024-10-30 PROCEDURE — G8421 BMI NOT CALCULATED: HCPCS | Performed by: INTERNAL MEDICINE

## 2024-10-30 PROCEDURE — 3017F COLORECTAL CA SCREEN DOC REV: CPT | Performed by: INTERNAL MEDICINE

## 2024-10-30 PROCEDURE — G8427 DOCREV CUR MEDS BY ELIG CLIN: HCPCS | Performed by: INTERNAL MEDICINE

## 2024-10-30 PROCEDURE — 1036F TOBACCO NON-USER: CPT | Performed by: INTERNAL MEDICINE

## 2024-10-30 PROCEDURE — 99213 OFFICE O/P EST LOW 20 MIN: CPT | Performed by: INTERNAL MEDICINE

## 2024-10-30 RX ORDER — FAMOTIDINE 40 MG/1
TABLET, FILM COATED ORAL
COMMUNITY
Start: 2024-10-15

## 2024-10-30 RX ORDER — OXYCODONE HYDROCHLORIDE 5 MG/1
5 TABLET ORAL EVERY 4 HOURS PRN
COMMUNITY
Start: 2024-08-14

## 2024-10-30 RX ORDER — LEVOTHYROXINE SODIUM 150 MCG
150 TABLET ORAL DAILY
Qty: 90 TABLET | Refills: 3 | Status: SHIPPED | OUTPATIENT
Start: 2024-10-30

## 2024-10-30 RX ORDER — LOSARTAN POTASSIUM AND HYDROCHLOROTHIAZIDE 25; 100 MG/1; MG/1
1 TABLET ORAL DAILY
COMMUNITY

## 2024-10-30 RX ORDER — SIMVASTATIN 80 MG
80 TABLET ORAL NIGHTLY
COMMUNITY

## 2024-10-30 ASSESSMENT — ENCOUNTER SYMPTOMS
CONSTIPATION: 0
DIARRHEA: 0
ROS SKIN COMMENTS: DENIES HAIR LOSS, DRY SKIN.

## 2024-10-30 NOTE — PROGRESS NOTES
Cardiovascular:  Negative for palpitations.   Gastrointestinal:  Negative for constipation and diarrhea.   Endocrine: Negative for cold intolerance and heat intolerance.   Skin:         Denies hair loss, dry skin.   Neurological:  Negative for tremors.       Vital Signs:  /82 (Site: Left Upper Arm, Position: Sitting, Cuff Size: Medium Adult)   Pulse 79   Wt 94.4 kg (208 lb 3.2 oz)   SpO2 98%     Wt Readings from Last 3 Encounters:   10/30/24 94.4 kg (208 lb 3.2 oz)   10/30/23 93 kg (205 lb)   04/27/23 92.5 kg (204 lb)       Physical Exam  Constitutional:       General: He is not in acute distress.  Neck:      Thyroid: No thyroid mass or thyromegaly.   Cardiovascular:      Rate and Rhythm: Normal rate and regular rhythm.   Lymphadenopathy:      Cervical: No cervical adenopathy.   Neurological:      Motor: No tremor.         Orders Placed This Encounter   Procedures    TSH with Reflex     Standing Status:   Future     Standing Expiration Date:   10/30/2025    TSH with Reflex     Standing Status:   Future     Standing Expiration Date:   4/30/2026         Current Outpatient Medications   Medication Sig Dispense Refill    famotidine (PEPCID) 40 MG tablet       losartan-hydroCHLOROthiazide (HYZAAR) 100-25 MG per tablet Take 1 tablet by mouth daily      oxyCODONE (ROXICODONE) 5 MG immediate release tablet Take 1 tablet by mouth every 4 hours as needed.      simvastatin (ZOCOR) 80 MG tablet Take 1 tablet by mouth nightly      SYNTHROID 150 MCG tablet Take 1 tablet by mouth Daily 90 tablet 3    citalopram (CELEXA) 20 MG tablet Take 1 tablet by mouth daily 90 tablet 5    donepezil (ARICEPT) 5 MG tablet TAKE 1 TABLET BY MOUTH EVERY NIGHT 90 tablet 4    memantine (NAMENDA) 10 MG tablet TAKE 1 TABLET TWICE A DAY (CALL OFFICE TO SCHEDULE APPOINTMENT [phone]) 180 tablet 3    PROGESTERONE IM Inject into the muscle      Saw Palmetto, Serenoa repens, (SAW PALMETTO PO) Take by mouth daily      dexlansoprazole

## 2025-01-22 ENCOUNTER — TELEPHONE (OUTPATIENT)
Dept: UROLOGY | Age: 65
End: 2025-01-22

## 2025-01-22 ENCOUNTER — OFFICE VISIT (OUTPATIENT)
Dept: UROLOGY | Age: 65
End: 2025-01-22
Payer: MEDICARE

## 2025-01-22 DIAGNOSIS — E29.1 HYPOGONADISM IN MALE: ICD-10-CM

## 2025-01-22 DIAGNOSIS — N52.9 ERECTILE DYSFUNCTION, UNSPECIFIED ERECTILE DYSFUNCTION TYPE: Primary | ICD-10-CM

## 2025-01-22 DIAGNOSIS — M79.5 FOREIGN BODY (FB) IN SOFT TISSUE: ICD-10-CM

## 2025-01-22 DIAGNOSIS — F52.4 PREMATURE EJACULATION: ICD-10-CM

## 2025-01-22 DIAGNOSIS — M79.5 FOREIGN BODY IN SOFT TISSUE: ICD-10-CM

## 2025-01-22 LAB
BILIRUBIN, URINE, POC: NEGATIVE
BLOOD URINE, POC: NEGATIVE
GLUCOSE URINE, POC: NEGATIVE MG/DL
KETONES, URINE, POC: NEGATIVE MG/DL
LEUKOCYTE ESTERASE, URINE, POC: NEGATIVE
NITRITE, URINE, POC: NEGATIVE
PH, URINE, POC: 6.5 (ref 4.6–8)
PROTEIN,URINE, POC: NEGATIVE MG/DL
SPECIFIC GRAVITY, URINE, POC: 1.01 (ref 1–1.03)
URINALYSIS CLARITY, POC: NORMAL
URINALYSIS COLOR, POC: NORMAL
UROBILINOGEN, POC: NORMAL MG/DL

## 2025-01-22 PROCEDURE — 1036F TOBACCO NON-USER: CPT | Performed by: UROLOGY

## 2025-01-22 PROCEDURE — G8427 DOCREV CUR MEDS BY ELIG CLIN: HCPCS | Performed by: UROLOGY

## 2025-01-22 PROCEDURE — 3017F COLORECTAL CA SCREEN DOC REV: CPT | Performed by: UROLOGY

## 2025-01-22 PROCEDURE — 99214 OFFICE O/P EST MOD 30 MIN: CPT | Performed by: UROLOGY

## 2025-01-22 PROCEDURE — 74018 RADEX ABDOMEN 1 VIEW: CPT | Performed by: UROLOGY

## 2025-01-22 PROCEDURE — 81003 URINALYSIS AUTO W/O SCOPE: CPT | Performed by: UROLOGY

## 2025-01-22 PROCEDURE — G8421 BMI NOT CALCULATED: HCPCS | Performed by: UROLOGY

## 2025-01-22 RX ORDER — BACILLUS COAGULANS/INULIN 1B-250 MG
1 CAPSULE ORAL DAILY
COMMUNITY

## 2025-01-22 RX ORDER — VERAPAMIL HYDROCHLORIDE 240 MG/1
240 CAPSULE, DELAYED RELEASE ORAL NIGHTLY
COMMUNITY
Start: 2025-01-14

## 2025-01-22 RX ORDER — SILDENAFIL 100 MG/1
TABLET, FILM COATED ORAL
COMMUNITY
Start: 2025-01-09

## 2025-01-22 RX ORDER — TESTOSTERONE CYPIONATE 200 MG/ML
INJECTION, SOLUTION INTRAMUSCULAR
COMMUNITY
Start: 2025-01-14

## 2025-01-22 RX ORDER — HYDROCODONE BITARTRATE AND ACETAMINOPHEN 10; 325 MG/1; MG/1
1 TABLET ORAL EVERY 6 HOURS PRN
COMMUNITY
Start: 2025-01-13

## 2025-01-22 RX ORDER — ALPRAZOLAM 0.5 MG
0.25 TABLET ORAL NIGHTLY PRN
COMMUNITY
Start: 2025-01-13

## 2025-01-22 RX ORDER — SIMVASTATIN 40 MG
40 TABLET ORAL NIGHTLY
COMMUNITY
Start: 2024-12-16

## 2025-01-22 ASSESSMENT — ENCOUNTER SYMPTOMS
BACK PAIN: 0
NAUSEA: 0

## 2025-01-22 NOTE — PROGRESS NOTES
[facility]  [address] [address]  [phone]          Vladislav Thorpe  : 1960    Chief Complaint   Patient presents with    3 Month Follow-Up          HPI     Vladislav Thorpe is a 64 y.o. male    The patient discontinued Celexa because it made him feel \"weird\".  He does not want to pursue any other medications for premature ejaculation.  He does say that a surgeon in [place] was giving him testosterone and apparently there is some sort of foreign body over the right buttock that was being used to distribute the medicine.      Past Medical History:   Diagnosis Date    Cardiac murmur     [facility], has not seen cardiologist in over 10 years, stress test 10/2019, EF 54%    Celiac disease     Coronary atherosclerosis of unspecified type of vessel, native or graft 3/4/2014    Esophageal reflux 3/4/2014    GERD (gastroesophageal reflux disease)     Hypertension     x 9 yrs- controlled with meds    Hypertonicity of bladder 3/4/2014    Hypertrophy of prostate without urinary obstruction and other lower urinary tract symptoms (LUTS) 3/4/2014    Impotence of organic origin 3/4/2014    Osteoarthrosis, unspecified whether generalized or localized, unspecified site 3/4/2014    Other abnormal glucose 3/4/2014    Other and unspecified hyperlipidemia 3/4/2014    Other specified disorder of male genital organs(608.89) 3/4/2014    Prostatitis, unspecified 3/4/2014    Sleep apnea     c pap at hs    Thyroid disease     graves dse x 9yrs    Unspecified hereditary and idiopathic peripheral neuropathy 3/4/2014    Unspecified hypothyroidism 3/4/2014    Urinary frequency 3/4/2014     Past Surgical History:   Procedure Laterality Date    CIRCUMCISION      CYSTOSCOPY      ORTHOPEDIC SURGERY      bilat bunionectomy, then right foot screw removed    ORTHOPEDIC SURGERY      right shoulder surg x 3    ORTHOPEDIC SURGERY      left shoulder

## 2025-01-22 NOTE — TELEPHONE ENCOUNTER
----- Message from Dr. Rigoberto Peacock MD sent at 1/22/2025 11:53 AM EST -----  Please schedule removal of foreign body from right buttock.  This should only take about 15 minutes.

## 2025-01-24 PROBLEM — M79.5 FOREIGN BODY IN SOFT TISSUE: Status: ACTIVE | Noted: 2025-01-22

## 2025-01-24 NOTE — TELEPHONE ENCOUNTER
Procedures: Procedure(s):   REMOVAL FOREIGN BODY RIGHT BUTTOCKS   Date: 1/28/2025   Time: 1033   Location: [facility] OR 03

## 2025-02-19 ENCOUNTER — TELEPHONE (OUTPATIENT)
Dept: UROLOGY | Age: 65
End: 2025-02-19

## 2025-02-19 NOTE — TELEPHONE ENCOUNTER
----- Message from Karo MAY sent at 2/18/2025  4:02 PM EST -----  Call pt to reschedule , canceled due to weather

## 2025-02-20 NOTE — TELEPHONE ENCOUNTER
Procedures: Procedure(s):   REMOVAL FOREIGN BODY RIGHT BUTTOCKS   Date: 3/21/2025   Time: 0916   Location: [facility] OR 01 [facility]

## (undated) DEVICE — TURP TURB: Brand: MEDLINE INDUSTRIES, INC.

## (undated) DEVICE — GARMENT,MEDLINE,DVT,INT,CALF,MED, GEN2: Brand: MEDLINE

## (undated) DEVICE — Y-TYPE TUR/BLADDER IRRIGATION SET, REGULATING CLAMP

## (undated) DEVICE — TUBING, SUCTION, 1/4" X 10', STRAIGHT: Brand: MEDLINE

## (undated) DEVICE — ELECTRODE ELECSURG QUIK FIRE HEMI ELECTRD

## (undated) DEVICE — TRAY PREP DRY W/ PREM GLV 2 APPL 6 SPNG 2 UNDPD 1 OVERWRAP

## (undated) DEVICE — SOLUTION IRRIG 1000ML H2O STRL BLT

## (undated) DEVICE — SOLUTION IRRIG 3000ML 0.9% SOD CHL FLX CONT 0797208] ICU MEDICAL INC]

## (undated) DEVICE — CATHETER,FOLEY,3-WAY,24FR,30ML,100%SILI: Brand: MEDLINE